# Patient Record
Sex: FEMALE | Race: WHITE
[De-identification: names, ages, dates, MRNs, and addresses within clinical notes are randomized per-mention and may not be internally consistent; named-entity substitution may affect disease eponyms.]

---

## 2021-01-26 ENCOUNTER — NURSE TRIAGE (OUTPATIENT)
Dept: OTHER | Facility: CLINIC | Age: 61
End: 2021-01-26

## 2021-01-26 NOTE — TELEPHONE ENCOUNTER
Reason for Disposition   [1] Caller requesting NON-URGENT health information AND [2] PCP's office is the best resource    Answer Assessment - Initial Assessment Questions  1. COVID-19 DIAGNOSIS: \"Who made your Coronavirus (COVID-19) diagnosis? \" \"Was it confirmed by a positive lab test?\" If not diagnosed by a HCP, ask \"Are there lots of cases (community spread) where you live? \" (See public health department website, if unsure)  No, diagnosis, no positive lab test, no recent outbreak. Stated she was concerned because \"Week before Christmas and week after News Year, I started feeling crappy. Felt like a balloon inside my head. \"         Feeling feverish, but no noted fever with thermometer. 2. COVID-19 EXPOSURE: \"Was there any known exposure to COVID before the symptoms began? \" CDC Definition of close contact: within 6 feet (2 meters) for a total of 15 minutes or more over a 24-hour period. Denies    3. ONSET: \"When did the COVID-19 symptoms start? \"       The week before Christmas. It went away after christmas but came the week after [employer] Year little worse. 4. WORST SYMPTOM: \"What is your worst symptom? \" (e.g., cough, fever, shortness of breath, muscle aches)        \"Nothing is worse today. \" Pt denies experiencing any symptoms at this time and stated that these symptoms were bothering her a week or so ago. Writer stopped triage as there are no symptoms to triage. Protocols used: INFORMATION ONLY CALL - NO TRIAGE-ADULT-AH, CORONAVIRUS (COVID-19) DIAGNOSED OR SUSPECTED-ADULT-AH    Brief description of triage: Abhilash Ryan began to triage Pt for concern of COVID, but during assessment, Pt explained that she was no longer experiencing symptoms and wanted to know if she had contracted it in the past.    Triage indicates for patient to no triage performed.  Writer recommended Pt call her PCP to see if she needs to be tested and gave her the [facility] phone number to call ([phone]) for testing information in her area.    Care advice provided, patient verbalizes understanding; denies any other questions or concerns; instructed to call back for any new or worsening symptoms. Writer unable to route call as no PCP listed. This triage is a result of a call to [address] [place] [address]. Please do not respond to the triage nurse through this encounter. Any subsequent communication should be directly with the patient.

## 2022-05-15 ENCOUNTER — NURSE TRIAGE (OUTPATIENT)
Dept: OTHER | Facility: CLINIC | Age: 62
End: 2022-05-15

## 2022-05-15 NOTE — TELEPHONE ENCOUNTER
Subjective: Caller states \"Last night I had rectal bleeding. \"     Current Symptoms: rectal bleeding- started last night- stopped, started again with a bowel movement    Onset: Rectal bleeding    Associated Symptoms: Denies    Pain Severity: Denies    Temperature: Denies    What has been tried: N/A    Recommended disposition: See PCP within 24 Hours    Care advice provided, patient verbalizes understanding; denies any other questions or concerns; instructed to call back for any new or worsening symptoms. Patient/caller agrees to follow-up with PCP      Patient given symptoms to watch out for included dizziness/lightheadedness, worsening rectal bleeding, and abdominal pain. If any new symptoms occur patient will be seen sooner at urgent care or ED. This triage is a result of a call to [address] [place] [address]. Please do not respond to the triage nurse through this encounter. Any subsequent communication should be directly with the patient.     Reason for Disposition   MODERATE rectal bleeding (small blood clots, passing blood without stool, or toilet water turns red)    Protocols used: RECTAL BLEEDING-ADULT-

## 2023-09-01 PROBLEM — G47.33 OBSTRUCTIVE SLEEP APNEA SYNDROME: Status: ACTIVE | Noted: 2023-09-01

## 2023-09-01 PROBLEM — H90.3 BILATERAL SENSORINEURAL HEARING LOSS: Status: ACTIVE | Noted: 2023-09-01

## 2023-09-01 PROBLEM — H93.19 TINNITUS, SUBJECTIVE: Status: ACTIVE | Noted: 2023-09-01

## 2023-09-01 PROBLEM — G47.19 EXCESSIVE DAYTIME SLEEPINESS: Status: ACTIVE | Noted: 2023-09-01

## 2023-09-01 PROBLEM — R60.9 DEPENDENT EDEMA: Status: ACTIVE | Noted: 2023-09-01

## 2023-09-01 PROBLEM — R53.83 FATIGUE: Status: ACTIVE | Noted: 2023-09-01

## 2023-09-01 PROBLEM — L81.9 DISCOLORATION OF SKIN OF LOWER LEG: Status: ACTIVE | Noted: 2023-09-01

## 2023-09-01 PROBLEM — L98.9 SKIN LESION: Status: ACTIVE | Noted: 2023-09-01

## 2023-09-01 PROBLEM — M19.039 ARTHRITIS, WRIST: Status: ACTIVE | Noted: 2023-09-01

## 2023-09-01 PROBLEM — R73.9 HYPERGLYCEMIA: Status: ACTIVE | Noted: 2023-09-01

## 2023-09-01 PROBLEM — K21.9 GERD WITHOUT ESOPHAGITIS: Status: ACTIVE | Noted: 2023-09-01

## 2023-09-01 PROBLEM — L20.9 XEROSIS DUE TO ATOPIC DERMATITIS: Status: ACTIVE | Noted: 2021-07-07

## 2023-09-01 PROBLEM — G89.29 OTHER CHRONIC PAIN: Status: ACTIVE | Noted: 2023-09-01

## 2023-09-01 PROBLEM — M15.9 GENERALIZED OSTEOARTHRITIS: Status: ACTIVE | Noted: 2023-09-01

## 2023-09-01 PROBLEM — E78.1 HYPERTRIGLYCERIDEMIA: Status: ACTIVE | Noted: 2023-09-01

## 2023-09-01 PROBLEM — R41.89 SUBJECTIVE MEMORY COMPLAINTS: Status: ACTIVE | Noted: 2023-09-01

## 2023-09-01 PROBLEM — F51.04 CHRONIC INSOMNIA: Status: ACTIVE | Noted: 2023-09-01

## 2023-09-01 PROBLEM — E66.01 MORBID OBESITY (MULTI): Status: ACTIVE | Noted: 2023-09-01

## 2023-09-01 PROBLEM — E03.9 HYPOTHYROIDISM: Status: ACTIVE | Noted: 2023-09-01

## 2023-09-01 PROBLEM — R42 VERTIGO: Status: ACTIVE | Noted: 2023-09-01

## 2023-09-01 PROBLEM — D48.5 NEOPLASM OF UNCERTAIN BEHAVIOR OF SKIN: Status: ACTIVE | Noted: 2021-07-07

## 2023-09-01 PROBLEM — R06.83 SNORING: Status: ACTIVE | Noted: 2023-09-01

## 2023-09-01 PROBLEM — M19.90 OSTEOARTHRITIS: Status: ACTIVE | Noted: 2023-09-01

## 2023-09-01 PROBLEM — F41.8 DEPRESSION WITH ANXIETY: Status: ACTIVE | Noted: 2023-09-01

## 2023-09-01 PROBLEM — E78.00 HYPERCHOLESTEREMIA: Status: ACTIVE | Noted: 2023-09-01

## 2023-09-01 PROBLEM — N95.0 POST-MENOPAUSAL BLEEDING: Status: ACTIVE | Noted: 2023-09-01

## 2023-09-01 PROBLEM — K44.9 HIATAL HERNIA: Status: ACTIVE | Noted: 2023-09-01

## 2023-09-01 PROBLEM — I87.2 VENOUS INSUFFICIENCY OF BOTH LOWER EXTREMITIES: Status: ACTIVE | Noted: 2023-09-01

## 2023-09-01 PROBLEM — R22.0 LOCALIZED SWELLING, MASS AND LUMP, HEAD: Status: ACTIVE | Noted: 2023-09-01

## 2023-09-01 PROBLEM — M19.021 PRIMARY OSTEOARTHRITIS OF RIGHT ELBOW: Status: ACTIVE | Noted: 2023-09-01

## 2023-09-01 PROBLEM — R20.2 PARESTHESIA: Status: ACTIVE | Noted: 2023-09-01

## 2023-09-01 PROBLEM — E55.9 VITAMIN D DEFICIENCY: Status: ACTIVE | Noted: 2023-09-01

## 2023-09-01 PROBLEM — M47.812 CERVICAL SPINE ARTHRITIS: Status: ACTIVE | Noted: 2023-09-01

## 2023-09-01 PROBLEM — M47.814 OSTEOARTHRITIS OF THORACIC SPINE: Status: ACTIVE | Noted: 2023-09-01

## 2023-09-01 RX ORDER — MULTIVITAMIN
1 TABLET ORAL DAILY
COMMUNITY

## 2023-09-01 RX ORDER — BUSPIRONE HYDROCHLORIDE 5 MG/1
5 TABLET ORAL DAILY
COMMUNITY
Start: 2021-04-12 | End: 2024-05-16 | Stop reason: WASHOUT

## 2023-09-01 RX ORDER — CHOLECALCIFEROL (VITAMIN D3) 50 MCG
3 TABLET ORAL DAILY
COMMUNITY
Start: 2019-04-22

## 2023-09-01 RX ORDER — IBUPROFEN 200 MG
200 TABLET ORAL EVERY 8 HOURS PRN
COMMUNITY

## 2023-09-01 RX ORDER — METFORMIN HYDROCHLORIDE 500 MG/1
500 TABLET ORAL DAILY
COMMUNITY
Start: 2021-04-12 | End: 2024-01-17 | Stop reason: ALTCHOICE

## 2023-09-01 RX ORDER — DULOXETIN HYDROCHLORIDE 60 MG/1
60 CAPSULE, DELAYED RELEASE ORAL DAILY
COMMUNITY
Start: 2019-08-29 | End: 2024-01-17 | Stop reason: SDUPTHER

## 2023-09-01 RX ORDER — ATORVASTATIN CALCIUM 10 MG/1
10 TABLET, FILM COATED ORAL DAILY
COMMUNITY
Start: 2019-03-07 | End: 2024-01-17 | Stop reason: SDUPTHER

## 2023-09-01 RX ORDER — ACETAMINOPHEN 500 MG
1-2 TABLET ORAL NIGHTLY PRN
COMMUNITY
Start: 2019-07-02

## 2024-01-10 NOTE — PROGRESS NOTES
"This is a 63 year old female NP to Dr Gil to establish care and for ROUTINE MEDICAL and MED REVIEW and REFILLS    Not currently in MENTAL HEALTH CARE with lifelong issues but no thoughts of self or other harm and agrees to my recommendation to enter Psychiatry care for therapy and talking and med review and refills prn    ROS is NEG for HEADACHE, NAUSEA, VOMITING, DIARRHEA, CHEST PAIN, SOB, and BLEEDING and as further REVIEWED BELOW.    Luisa Pena is a 63 y.o. female who presents for Establish Care.    HPI:    See above      Review of systems is essentially negative for all systems except for any identified issues in HPI above.    Objective     /80   Pulse 73   Temp 36.7 °C (98.1 °F)   Ht 1.575 m (5' 2\")   Wt 110 kg (242 lb)   SpO2 97%   BMI 44.26 kg/m²      Luisa Pena is a 63 y.o. female who presents for Establish Care.    HPI:    See above      Review of systems is essentially negative for all systems except for any identified issues in HPI above.    Objective     /80   Pulse 73   Temp 36.7 °C (98.1 °F)   Ht 1.575 m (5' 2\")   Wt 110 kg (242 lb)   SpO2 97%   BMI 44.26 kg/m²      COMPLETE PHYSICAL EXAM    GENERAL           General Appearance: pleasant, well-appearing, well-developed, well-hydrated, well-nourished, well-groomed, .        HEENT           NECK supple, Neg for adneopathy no thyroid enlargement or nodules, Oropharynx normal no exudates.        EYES           Pupils: PERRLA, no photophobia.        HEART           Rate and Rhythm regular rate and rhythm. Heart sounds: normal S1S2. Murmurs: none.        LUNGS           Effort: Normal chest wall, no pectus, Normal air entry all fields, Clear to IPPA, RR<16 with no use of accessory muscles.        BREASTS           Bilaterally: no masses, no nipple retraction, no nipple discharge, no abnormal skin changes. Axilla: no lymphadenopathy.        BACK           General: unremarkable, no spinal tenderness " or rashes.        ABDOMEN           General: Normal to inspection, neg for LKKS or masses and BSs heard in all quadrants               LYMPHATICS           Cervical: none. Axillary: none.        MUSCULOSKELETAL           gross abnormalities no gross abnormalities, no joint redness or swelling.        EXTREMITIES           Varicose veins: not present. Pulses: 2+ bilateral. Clubbing: none. Cyanosis: no.        NEUROLOGICAL           Orientation: alert and oriented x 3. Grossly normal: yes. Plantars: downgoing bilaterally. Muscle Bulk: normal . Cranial Nerves: CN's II-XII grossly intact.        PSYCHOLOGY           Affect: appropriate. Mood: pleasant.     Assessment/Plan   Problem List Items Addressed This Visit       Snoring    Fatigue     Other Visit Diagnoses       Routine medical exam    -  Primary    Relevant Orders    Comprehensive metabolic panel    Microscopic Only, Urine    Microscopic Only, Urine    Health counseling        Immunization counseling        OA (ocular albinism) (CMS/HCC)        Screening mammogram for breast cancer        Relevant Orders    BI mammo bilateral screening tomosynthesis    Screening for colorectal cancer        Relevant Orders    CBC    Screening, lipid        Relevant Orders    Lipid panel            FOLLOW UP:   YEARLY for ROUTINE MEDICAL and PRN for other issues as discussed in visit         Elza Gil M.D. Assessment/Plan   Problem List Items Addressed This Visit       Snoring    Fatigue     Other Visit Diagnoses       Routine medical exam    -  Primary    Relevant Orders    Comprehensive metabolic panel    Microscopic Only, Urine    Microscopic Only, Urine    Health counseling        Immunization counseling        OA (ocular albinism) (CMS/HCC)        Screening mammogram for breast cancer        Relevant Orders    BI mammo bilateral screening tomosynthesis    Screening for colorectal cancer        Relevant Orders    CBC    Screening, lipid        Relevant Orders    Lipid panel             FOLLOW UP:  PRN and as specified above         Elza Gil M.D.

## 2024-01-17 ENCOUNTER — OFFICE VISIT (OUTPATIENT)
Dept: PRIMARY CARE | Facility: CLINIC | Age: 64
End: 2024-01-17
Payer: MEDICARE

## 2024-01-17 VITALS
DIASTOLIC BLOOD PRESSURE: 80 MMHG | WEIGHT: 242 LBS | BODY MASS INDEX: 44.53 KG/M2 | TEMPERATURE: 98.1 F | HEIGHT: 62 IN | HEART RATE: 73 BPM | SYSTOLIC BLOOD PRESSURE: 124 MMHG | OXYGEN SATURATION: 97 %

## 2024-01-17 DIAGNOSIS — E55.9 VITAMIN D DEFICIENCY: ICD-10-CM

## 2024-01-17 DIAGNOSIS — R53.83 FATIGUE, UNSPECIFIED TYPE: ICD-10-CM

## 2024-01-17 DIAGNOSIS — Z12.11 SCREENING FOR COLORECTAL CANCER: ICD-10-CM

## 2024-01-17 DIAGNOSIS — Z12.31 SCREENING MAMMOGRAM FOR BREAST CANCER: ICD-10-CM

## 2024-01-17 DIAGNOSIS — Z71.9 HEALTH COUNSELING: ICD-10-CM

## 2024-01-17 DIAGNOSIS — Z13.220 SCREENING, LIPID: ICD-10-CM

## 2024-01-17 DIAGNOSIS — Z76.0 MEDICATION REFILL: ICD-10-CM

## 2024-01-17 DIAGNOSIS — Z71.85 IMMUNIZATION COUNSELING: ICD-10-CM

## 2024-01-17 DIAGNOSIS — Z13.9 SCREENING DUE: ICD-10-CM

## 2024-01-17 DIAGNOSIS — E70.319 OA (OCULAR ALBINISM) (MULTI): ICD-10-CM

## 2024-01-17 DIAGNOSIS — R06.83 SNORING: ICD-10-CM

## 2024-01-17 DIAGNOSIS — F41.9 ANXIETY: ICD-10-CM

## 2024-01-17 DIAGNOSIS — Z00.00 ROUTINE MEDICAL EXAM: Primary | ICD-10-CM

## 2024-01-17 DIAGNOSIS — Z12.12 SCREENING FOR COLORECTAL CANCER: ICD-10-CM

## 2024-01-17 PROCEDURE — 90677 PCV20 VACCINE IM: CPT | Mod: MUE | Performed by: FAMILY MEDICINE

## 2024-01-17 PROCEDURE — 99396 PREV VISIT EST AGE 40-64: CPT | Performed by: FAMILY MEDICINE

## 2024-01-17 PROCEDURE — 90677 PCV20 VACCINE IM: CPT

## 2024-01-17 PROCEDURE — 1036F TOBACCO NON-USER: CPT | Performed by: FAMILY MEDICINE

## 2024-01-17 RX ORDER — DULOXETIN HYDROCHLORIDE 60 MG/1
60 CAPSULE, DELAYED RELEASE ORAL DAILY
Qty: 30 CAPSULE | Refills: 0 | Status: SHIPPED | OUTPATIENT
Start: 2024-01-17 | End: 2024-02-06

## 2024-01-17 RX ORDER — ATORVASTATIN CALCIUM 10 MG/1
10 TABLET, FILM COATED ORAL DAILY
Qty: 30 TABLET | Refills: 11 | Status: SHIPPED | OUTPATIENT
Start: 2024-01-17

## 2024-01-17 ASSESSMENT — PATIENT HEALTH QUESTIONNAIRE - PHQ9
SUM OF ALL RESPONSES TO PHQ9 QUESTIONS 1 AND 2: 0
1. LITTLE INTEREST OR PLEASURE IN DOING THINGS: NOT AT ALL
2. FEELING DOWN, DEPRESSED OR HOPELESS: NOT AT ALL

## 2024-01-17 ASSESSMENT — PAIN SCALES - GENERAL: PAINLEVEL: 0-NO PAIN

## 2024-01-19 ENCOUNTER — LAB (OUTPATIENT)
Dept: LAB | Facility: LAB | Age: 64
End: 2024-01-19
Payer: MEDICARE

## 2024-01-19 DIAGNOSIS — Z12.11 SCREENING FOR COLORECTAL CANCER: ICD-10-CM

## 2024-01-19 DIAGNOSIS — Z12.12 SCREENING FOR COLORECTAL CANCER: ICD-10-CM

## 2024-01-19 DIAGNOSIS — Z13.9 SCREENING DUE: ICD-10-CM

## 2024-01-19 DIAGNOSIS — Z00.00 ROUTINE MEDICAL EXAM: ICD-10-CM

## 2024-01-19 DIAGNOSIS — Z13.220 SCREENING, LIPID: ICD-10-CM

## 2024-01-19 DIAGNOSIS — E55.9 VITAMIN D DEFICIENCY: ICD-10-CM

## 2024-01-19 LAB
25(OH)D3 SERPL-MCNC: 52 NG/ML (ref 31–100)
ALBUMIN SERPL-MCNC: 4.5 G/DL (ref 3.5–5)
ALP BLD-CCNC: 75 U/L (ref 35–125)
ALT SERPL-CCNC: 36 U/L (ref 5–40)
ANION GAP SERPL CALC-SCNC: 13 MMOL/L
AST SERPL-CCNC: 28 U/L (ref 5–40)
BACTERIA #/AREA URNS AUTO: ABNORMAL /HPF
BILIRUB SERPL-MCNC: 0.4 MG/DL (ref 0.1–1.2)
BUN SERPL-MCNC: 14 MG/DL (ref 8–25)
CALCIUM SERPL-MCNC: 9.7 MG/DL (ref 8.5–10.4)
CHLORIDE SERPL-SCNC: 101 MMOL/L (ref 97–107)
CHOLEST SERPL-MCNC: 215 MG/DL (ref 133–200)
CHOLEST/HDLC SERPL: 4.7 {RATIO}
CO2 SERPL-SCNC: 25 MMOL/L (ref 24–31)
CREAT SERPL-MCNC: 0.8 MG/DL (ref 0.4–1.6)
EGFRCR SERPLBLD CKD-EPI 2021: 82 ML/MIN/1.73M*2
ERYTHROCYTE [DISTWIDTH] IN BLOOD BY AUTOMATED COUNT: 13.1 % (ref 11.5–14.5)
GLUCOSE SERPL-MCNC: 102 MG/DL (ref 65–99)
HCT VFR BLD AUTO: 45.8 % (ref 36–46)
HCV AB SER QL: NONREACTIVE
HDLC SERPL-MCNC: 46 MG/DL
HGB BLD-MCNC: 15 G/DL (ref 12–16)
HIV 1+2 AB+HIV1 P24 AG SERPL QL IA: NONREACTIVE
LDLC SERPL CALC-MCNC: 128 MG/DL (ref 65–130)
MCH RBC QN AUTO: 32.3 PG (ref 26–34)
MCHC RBC AUTO-ENTMCNC: 32.8 G/DL (ref 32–36)
MCV RBC AUTO: 99 FL (ref 80–100)
NRBC BLD-RTO: 0 /100 WBCS (ref 0–0)
PLATELET # BLD AUTO: 188 X10*3/UL (ref 150–450)
POTASSIUM SERPL-SCNC: 4.9 MMOL/L (ref 3.4–5.1)
PROT SERPL-MCNC: 6.8 G/DL (ref 5.9–7.9)
RBC # BLD AUTO: 4.65 X10*6/UL (ref 4–5.2)
RBC #/AREA URNS AUTO: ABNORMAL /HPF
SODIUM SERPL-SCNC: 139 MMOL/L (ref 133–145)
SQUAMOUS #/AREA URNS AUTO: ABNORMAL /HPF
TRIGL SERPL-MCNC: 207 MG/DL (ref 40–150)
WBC # BLD AUTO: 5.6 X10*3/UL (ref 4.4–11.3)
WBC #/AREA URNS AUTO: ABNORMAL /HPF

## 2024-01-19 PROCEDURE — 81001 URINALYSIS AUTO W/SCOPE: CPT

## 2024-01-19 PROCEDURE — 82306 VITAMIN D 25 HYDROXY: CPT

## 2024-01-19 PROCEDURE — 80061 LIPID PANEL: CPT

## 2024-01-19 PROCEDURE — 36415 COLL VENOUS BLD VENIPUNCTURE: CPT

## 2024-01-19 PROCEDURE — 85027 COMPLETE CBC AUTOMATED: CPT

## 2024-01-19 PROCEDURE — 80053 COMPREHEN METABOLIC PANEL: CPT

## 2024-01-19 PROCEDURE — 86803 HEPATITIS C AB TEST: CPT

## 2024-01-19 PROCEDURE — 87389 HIV-1 AG W/HIV-1&-2 AB AG IA: CPT

## 2024-01-23 NOTE — PROGRESS NOTES
"This is a 64 year old female for EVAL of LIPID PANEL and consideration of options and is currently on ATORVASTATIN 10 mg once daily.  VERY CONCERNING is her PRESENTATION today with RECENT SOBOE and FATIGUE and POS FAM Hx ASHD and we are recommending CARDIOLOGY EVAL and CORONARY CT CALCIUM SCORING and of course, to report to ER if CP or worsening sxs occur while awaiting further work up.    SHE DECLINES increasing statin at this time in favor of the work up and a repeat LIPID PANEL once reliably taking her STATIN at low dose but is educated that we would need to INCREASE this dose signficantly for CARDIOPROTECTION.        Lab Results   Component Value Date    CHOL 215 (H) 01/19/2024    CHOL 157 06/05/2023    CHOL 168 04/26/2022     Lab Results   Component Value Date    HDL 46.0 (L) 01/19/2024    HDL 48 (L) 06/05/2023    HDL 51 04/26/2022     Lab Results   Component Value Date    LDLCALC 128 01/19/2024    LDLCALC 78 06/05/2023    LDLCALC 93 04/26/2022     Lab Results   Component Value Date    TRIG 207 (H) 01/19/2024    TRIG 153 (H) 06/05/2023    TRIG 119 04/26/2022     No components found for: \"CHOLHDL\"    She wishes to keep MED as is and admits to NON COMPLIANCE    HAS FAM Hx HEART DIS ASHD and A FIB etc so agrees to CORONARY CALCIUM SCREENING    SOME FATIGUE and SOB on WALKING       Elza Jacobs MD  1/19/2024  3:26 PM EST       LIPID panel SUBOPTIMAL so recommend a DEDICATED FU visit to review options such as INCREASTING her ATORVASTATIN from the low dose of 10 mg to at least 40 mg daily; and further evaluate her  risks for ASHD and consider CT CORONARY CALCIUM scoring testing  CMP wnl OAR; VIT D normal at 52 so if taking MEGADOSE may DC and take OTC now  Please verify her dose of VIT D.           ROS is NEG for HEADACHE, NAUSEA, VOMITING, DIARRHEA, CHEST PAIN, SOB, and BLEEDING and as further REVIEWED BELOW.    Luisa Pena is a 64 y.o. female who presents for No chief complaint on " file..    HPI:    See above      Review of systems is essentially negative for all systems except for any identified issues in HPI above.    Objective     There were no vitals taken for this visit.     Physical Examination:       GENERAL           General Appearance: well-appearing, well-developed, well-hydrated, well-nourished, no acute distress.        HEENT           NECK supple, no masses or thyromegaly, no carotid bruit.        EYES           Extraocular Movements: normal, bilateral eyes YEIMI, no conjunctival injection.        HEART           Rate and Rhythm regular rate and rhythm. Heart sounds: normal S1S2, no S3 or S4. Murmurs: none.        CHEST           Breath sounds: Clear to IPPA, RR<16 no use of accessory muscles.        ABDOMEN           General: Neg for LKKS or masses, no scleral icterus or jaundice.        MUSCULOSKELETAL           Joints Demonstration: Neg for erythema, swelling or joint deformities. gross abnormalities no gross abnormalities.        EXTREMITIES           Lower Extremities: Neg for cyanosis, clubbing or edema.       Assessment/Plan   Problem List Items Addressed This Visit    None      FOLLOW UP:  PRN and as specified above         Elza Gil M.D.

## 2024-01-31 ENCOUNTER — LAB (OUTPATIENT)
Dept: LAB | Facility: LAB | Age: 64
End: 2024-01-31
Payer: MEDICARE

## 2024-01-31 ENCOUNTER — OFFICE VISIT (OUTPATIENT)
Dept: PRIMARY CARE | Facility: CLINIC | Age: 64
End: 2024-01-31
Payer: MEDICARE

## 2024-01-31 ENCOUNTER — HOSPITAL ENCOUNTER (OUTPATIENT)
Dept: RADIOLOGY | Facility: CLINIC | Age: 64
Discharge: HOME | End: 2024-01-31
Payer: MEDICARE

## 2024-01-31 VITALS
HEART RATE: 79 BPM | TEMPERATURE: 97 F | HEIGHT: 62 IN | WEIGHT: 240 LBS | BODY MASS INDEX: 44.16 KG/M2 | OXYGEN SATURATION: 99 % | SYSTOLIC BLOOD PRESSURE: 120 MMHG | DIASTOLIC BLOOD PRESSURE: 72 MMHG

## 2024-01-31 DIAGNOSIS — Z71.89 ENCOUNTER FOR COUNSELING BEFORE STARTING AND ABOUT PRE-EXPOSURE PROPHYLAXIS FOR HIV: ICD-10-CM

## 2024-01-31 DIAGNOSIS — Z13.6 SCREENING, HEART DISEASE, ISCHEMIC: ICD-10-CM

## 2024-01-31 DIAGNOSIS — R53.83 OTHER FATIGUE: ICD-10-CM

## 2024-01-31 DIAGNOSIS — Z79.899 ENCOUNTER FOR MEDICATION REVIEW: ICD-10-CM

## 2024-01-31 DIAGNOSIS — Z71.9 HEALTH COUNSELING: ICD-10-CM

## 2024-01-31 DIAGNOSIS — R06.02 SOB (SHORTNESS OF BREATH): ICD-10-CM

## 2024-01-31 DIAGNOSIS — Z82.49 FAMILY HISTORY OF HEART DISEASE: ICD-10-CM

## 2024-01-31 DIAGNOSIS — E78.5 HYPERLIPIDEMIA, UNSPECIFIED HYPERLIPIDEMIA TYPE: ICD-10-CM

## 2024-01-31 DIAGNOSIS — R53.83 OTHER FATIGUE: Primary | ICD-10-CM

## 2024-01-31 DIAGNOSIS — E78.2 COMBINED HYPERLIPIDEMIA: ICD-10-CM

## 2024-01-31 LAB
ANION GAP SERPL CALC-SCNC: 13 MMOL/L
BUN SERPL-MCNC: 13 MG/DL (ref 8–25)
CALCIUM SERPL-MCNC: 9.7 MG/DL (ref 8.5–10.4)
CHLORIDE SERPL-SCNC: 104 MMOL/L (ref 97–107)
CO2 SERPL-SCNC: 25 MMOL/L (ref 24–31)
CREAT SERPL-MCNC: 0.9 MG/DL (ref 0.4–1.6)
EGFRCR SERPLBLD CKD-EPI 2021: 71 ML/MIN/1.73M*2
GLUCOSE SERPL-MCNC: 107 MG/DL (ref 65–99)
POTASSIUM SERPL-SCNC: 4.7 MMOL/L (ref 3.4–5.1)
SODIUM SERPL-SCNC: 142 MMOL/L (ref 133–145)
TSH SERPL DL<=0.05 MIU/L-ACNC: 4.05 MIU/L (ref 0.27–4.2)

## 2024-01-31 PROCEDURE — 1036F TOBACCO NON-USER: CPT | Performed by: FAMILY MEDICINE

## 2024-01-31 PROCEDURE — 99214 OFFICE O/P EST MOD 30 MIN: CPT | Performed by: FAMILY MEDICINE

## 2024-01-31 PROCEDURE — 36415 COLL VENOUS BLD VENIPUNCTURE: CPT

## 2024-01-31 PROCEDURE — 1159F MED LIST DOCD IN RCRD: CPT | Performed by: FAMILY MEDICINE

## 2024-01-31 PROCEDURE — 99214 OFFICE O/P EST MOD 30 MIN: CPT | Mod: 25 | Performed by: FAMILY MEDICINE

## 2024-01-31 PROCEDURE — 71046 X-RAY EXAM CHEST 2 VIEWS: CPT

## 2024-01-31 PROCEDURE — 1126F AMNT PAIN NOTED NONE PRSNT: CPT | Performed by: FAMILY MEDICINE

## 2024-01-31 PROCEDURE — 80048 BASIC METABOLIC PNL TOTAL CA: CPT

## 2024-01-31 PROCEDURE — 84443 ASSAY THYROID STIM HORMONE: CPT

## 2024-01-31 PROCEDURE — 93005 ELECTROCARDIOGRAM TRACING: CPT | Performed by: FAMILY MEDICINE

## 2024-01-31 PROCEDURE — 93010 ELECTROCARDIOGRAM REPORT: CPT | Performed by: FAMILY MEDICINE

## 2024-01-31 ASSESSMENT — PATIENT HEALTH QUESTIONNAIRE - PHQ9
1. LITTLE INTEREST OR PLEASURE IN DOING THINGS: NOT AT ALL
2. FEELING DOWN, DEPRESSED OR HOPELESS: NOT AT ALL
SUM OF ALL RESPONSES TO PHQ9 QUESTIONS 1 AND 2: 0

## 2024-01-31 ASSESSMENT — PAIN SCALES - GENERAL: PAINLEVEL: 0-NO PAIN

## 2024-01-31 NOTE — PATIENT INSTRUCTIONS
This is a 64 year old female for EVAL of LIPID PANEL and consideration of options and is currently on ATORVASTATIN 10 mg once daily.  VERY CONCERNING is her PRESENTATION today with RECENT SOBOE and FATIGUE and POS FAM Hx ASHD and we are recommending CARDIOLOGY EVAL and CORONARY CT CALCIUM SCORING and of course, to report to ER if CP or worsening sxs occur while awaiting further work up.    SHE DECLINES increasing statin at this time in favor of the work up and a repeat LIPID PANEL once reliably taking her STATIN at low dose but is educated that we would need to INCREASE this dose signficantly for CARDIOPROTECTION.

## 2024-02-02 ENCOUNTER — HOSPITAL ENCOUNTER (OUTPATIENT)
Dept: RADIOLOGY | Facility: HOSPITAL | Age: 64
Discharge: HOME | End: 2024-02-02
Payer: MEDICARE

## 2024-02-02 DIAGNOSIS — Z13.6 SCREENING, HEART DISEASE, ISCHEMIC: ICD-10-CM

## 2024-02-02 PROCEDURE — 75571 CT HRT W/O DYE W/CA TEST: CPT

## 2024-02-06 DIAGNOSIS — Z76.0 MEDICATION REFILL: ICD-10-CM

## 2024-02-06 RX ORDER — DULOXETIN HYDROCHLORIDE 60 MG/1
CAPSULE, DELAYED RELEASE ORAL
Qty: 30 CAPSULE | Refills: 2 | Status: SHIPPED | OUTPATIENT
Start: 2024-02-06 | End: 2024-05-15

## 2024-02-22 PROBLEM — R06.02 SHORTNESS OF BREATH: Status: ACTIVE | Noted: 2024-02-22

## 2024-02-22 PROBLEM — M16.11 PRIMARY OSTEOARTHRITIS OF RIGHT HIP: Status: ACTIVE | Noted: 2017-01-17

## 2024-02-22 PROBLEM — R41.3 MEMORY IMPAIRMENT: Status: ACTIVE | Noted: 2024-02-22

## 2024-02-22 PROBLEM — F41.9 ANXIETY: Status: ACTIVE | Noted: 2023-01-03

## 2024-02-22 PROBLEM — M25.562 CHRONIC PAIN OF LEFT KNEE: Status: ACTIVE | Noted: 2018-08-21

## 2024-02-22 PROBLEM — G56.00 CARPAL TUNNEL SYNDROME: Status: ACTIVE | Noted: 2024-02-22

## 2024-02-22 PROBLEM — H91.90 HEARING LOSS: Status: ACTIVE | Noted: 2024-02-22

## 2024-02-22 PROBLEM — Z98.890 HISTORY OF REPAIR OF HIP JOINT: Status: ACTIVE | Noted: 2017-01-17

## 2024-02-22 PROBLEM — Z91.51 HISTORY OF SUICIDE ATTEMPT: Status: ACTIVE | Noted: 2024-02-22

## 2024-02-22 PROBLEM — I99.8 VASCULAR INSUFFICIENCY: Status: ACTIVE | Noted: 2024-02-22

## 2024-02-22 PROBLEM — M24.521 CONTRACTURE OF RIGHT ELBOW: Status: ACTIVE | Noted: 2024-02-22

## 2024-02-22 PROBLEM — Z96.651 HISTORY OF RIGHT KNEE JOINT REPLACEMENT: Status: ACTIVE | Noted: 2017-01-17

## 2024-02-22 PROBLEM — R29.898 RIGHT LEG WEAKNESS: Status: ACTIVE | Noted: 2017-03-14

## 2024-02-22 PROBLEM — M76.11 PSOAS TENDINITIS OF RIGHT SIDE: Status: ACTIVE | Noted: 2019-04-22

## 2024-02-22 PROBLEM — E70.319 OCULAR ALBINISM (MULTI): Status: ACTIVE | Noted: 2024-02-22

## 2024-02-22 PROBLEM — G89.29 CHRONIC PAIN OF LEFT KNEE: Status: ACTIVE | Noted: 2018-08-21

## 2024-02-22 PROBLEM — M79.643 PAIN OF HAND: Status: ACTIVE | Noted: 2024-02-22

## 2024-02-22 PROBLEM — M25.559 ARTHRALGIA OF HIP: Status: ACTIVE | Noted: 2024-02-22

## 2024-02-22 RX ORDER — ASPIRIN 325 MG
1 TABLET, DELAYED RELEASE (ENTERIC COATED) ORAL 2 TIMES DAILY
COMMUNITY
Start: 2018-07-17

## 2024-02-22 RX ORDER — OMEGA-3-ACID ETHYL ESTERS 1 G/1
CAPSULE, LIQUID FILLED ORAL
COMMUNITY
Start: 2019-04-22 | End: 2024-05-16 | Stop reason: WASHOUT

## 2024-02-22 RX ORDER — CHLORHEXIDINE GLUCONATE ORAL RINSE 1.2 MG/ML
SOLUTION DENTAL
COMMUNITY
Start: 2023-04-03 | End: 2024-05-16 | Stop reason: WASHOUT

## 2024-02-22 RX ORDER — BACLOFEN 10 MG/1
TABLET ORAL EVERY 8 HOURS PRN
COMMUNITY
Start: 2018-07-17 | End: 2024-05-16 | Stop reason: WASHOUT

## 2024-02-22 RX ORDER — DOCUSATE SODIUM 100 MG/1
1 CAPSULE, LIQUID FILLED ORAL 2 TIMES DAILY
COMMUNITY
Start: 2018-07-17 | End: 2024-05-16 | Stop reason: WASHOUT

## 2024-02-22 RX ORDER — MULTIVITAMIN
TABLET ORAL EVERY 24 HOURS
COMMUNITY
End: 2024-02-26 | Stop reason: SDUPTHER

## 2024-02-22 RX ORDER — BISACODYL 5 MG
2 TABLET, DELAYED RELEASE (ENTERIC COATED) ORAL DAILY
COMMUNITY
Start: 2018-07-18 | End: 2024-05-16 | Stop reason: WASHOUT

## 2024-02-22 RX ORDER — ZALEPLON 5 MG/1
CAPSULE ORAL
COMMUNITY
Start: 2022-11-16 | End: 2024-05-16 | Stop reason: WASHOUT

## 2024-02-22 RX ORDER — TRAMADOL HYDROCHLORIDE 100 MG/1
1 TABLET, EXTENDED RELEASE ORAL AS NEEDED
COMMUNITY
Start: 2012-01-04

## 2024-02-22 RX ORDER — MULTIVIT WITH IRON,MINERALS
TABLET,CHEWABLE ORAL
COMMUNITY
Start: 2019-07-02 | End: 2024-05-16 | Stop reason: WASHOUT

## 2024-02-22 RX ORDER — GLUCOSAMINE/D3/BOSWELLIA SERRA 1500MG-400
1 TABLET ORAL 2 TIMES DAILY
COMMUNITY
Start: 2017-02-02 | End: 2024-05-16 | Stop reason: WASHOUT

## 2024-02-26 ENCOUNTER — OFFICE VISIT (OUTPATIENT)
Dept: CARDIOLOGY | Facility: CLINIC | Age: 64
End: 2024-02-26
Payer: MEDICARE

## 2024-02-26 VITALS
OXYGEN SATURATION: 96 % | HEIGHT: 62 IN | DIASTOLIC BLOOD PRESSURE: 74 MMHG | BODY MASS INDEX: 44.2 KG/M2 | TEMPERATURE: 98.9 F | HEART RATE: 64 BPM | SYSTOLIC BLOOD PRESSURE: 140 MMHG | WEIGHT: 240.2 LBS | RESPIRATION RATE: 18 BRPM

## 2024-02-26 DIAGNOSIS — K21.9 GERD WITHOUT ESOPHAGITIS: ICD-10-CM

## 2024-02-26 DIAGNOSIS — G47.33 OBSTRUCTIVE SLEEP APNEA SYNDROME: ICD-10-CM

## 2024-02-26 DIAGNOSIS — E66.01 MORBID OBESITY (MULTI): ICD-10-CM

## 2024-02-26 DIAGNOSIS — E78.00 HYPERCHOLESTEREMIA: Primary | ICD-10-CM

## 2024-02-26 PROCEDURE — 1126F AMNT PAIN NOTED NONE PRSNT: CPT | Performed by: INTERNAL MEDICINE

## 2024-02-26 PROCEDURE — 1159F MED LIST DOCD IN RCRD: CPT | Performed by: INTERNAL MEDICINE

## 2024-02-26 PROCEDURE — 1036F TOBACCO NON-USER: CPT | Performed by: INTERNAL MEDICINE

## 2024-02-26 PROCEDURE — 99213 OFFICE O/P EST LOW 20 MIN: CPT | Performed by: INTERNAL MEDICINE

## 2024-02-26 ASSESSMENT — PATIENT HEALTH QUESTIONNAIRE - PHQ9
SUM OF ALL RESPONSES TO PHQ9 QUESTIONS 1 AND 2: 0
2. FEELING DOWN, DEPRESSED OR HOPELESS: NOT AT ALL
1. LITTLE INTEREST OR PLEASURE IN DOING THINGS: NOT AT ALL

## 2024-02-26 ASSESSMENT — PAIN SCALES - GENERAL: PAINLEVEL: 0-NO PAIN

## 2024-02-26 NOTE — ADDENDUM NOTE
Addended by: THEODORE ALCOCER on: 2/26/2024 04:02 PM     Modules accepted: Orders, Level of Service

## 2024-02-26 NOTE — PROGRESS NOTES
Subjective   Chief Complaint   Patient presents with    Establish Care     MrsTracey Pena is present to establish relationship with Dr. Yarbrough for Cardiac Eval and Treat after referral from Dr. Gil. Pt has had episodes of SOB, sleeps with a CPAP and has Hx of heart disease in family        64 female patient with a history of morbid obesity, sleep apnea.  Currently only on Lipitor 10 mg once a day.  Family history of CAD as well as MI in the past.  Patient now here for evaluation of possible coronary CT calcium score.  No active chest pain tightness does fairly active.  Underlying history of sleep apnea.  Here for routine evaluation.    Past Medical History:   Diagnosis Date    Carpal tunnel syndrome, bilateral upper limbs     Carpal tunnel syndrome, bilateral    Hypercholesteremia 2023    Hypertriglyceridemia 2023    Morbid (severe) obesity due to excess calories (CMS/HCC)     Obesity, morbid (more than 100 lbs over ideal weight or BMI > 40)    Pain in unspecified elbow 2014    Pain, elbow    Pain in unspecified hip 2016    Hip pain    Pain in unspecified shoulder 2016    Pain, joint, shoulder    Personal history of other diseases of the musculoskeletal system and connective tissue     Personal history of arthritis    Personal history of other diseases of the nervous system and sense organs     History of sleep apnea    Personal history of other endocrine, nutritional and metabolic disease 2018    History of vitamin D deficiency    Personal history of suicidal behavior     History of suicide attempt    Vascular insufficiency 2024    Venous insufficiency of both lower extremities 2023     Past Surgical History:   Procedure Laterality Date    MOUTH SURGERY  2018    Oral Surgery Tooth Extraction    OTHER SURGICAL HISTORY  2019    Jaw surgery    OTHER SURGICAL HISTORY  2018    Surgically Induced  - By Dilation And Curettage    TOTAL HIP  ARTHROPLASTY  03/22/2018    Total Hip Replacement    TOTAL KNEE ARTHROPLASTY  02/14/2019    Knee Replacement    UMBILICAL HERNIA REPAIR  03/22/2018    Umbilical Hernia Repair     Family medical history includes dementia in mother's sister and stroke in mother.  Current Outpatient Medications   Medication Sig Dispense Refill    atorvastatin (Lipitor) 10 mg tablet Take 1 tablet (10 mg) by mouth once daily. 30 tablet 11    busPIRone (Buspar) 5 mg tablet Take 1 tablet (5 mg) by mouth once daily.      cholecalciferol (Vitamin D-3) 50 MCG (2000 UT) tablet Take 3 tablets (6,000 Units) by mouth once daily.      DULoxetine (Cymbalta) 60 mg DR capsule TAKE 1 CAPSULE(60 MG) BY MOUTH EVERY DAY 30 capsule 2    fish oil concentrate (Omega-3) 120-180 mg capsule Take 4 capsules (4 g) by mouth once daily.      ibuprofen 200 mg tablet Take 1 tablet (200 mg) by mouth 3 times a day. with food or milk      melatonin 5 mg tablet Take 1-2 tablets (5-10 mg) by mouth as needed at bedtime (insomnia).      multivitamin tablet Take 1 tablet by mouth once daily.      aspirin 325 mg EC tablet Take 1 tablet (325 mg) by mouth 2 times a day.      baclofen (Lioresal) 10 mg tablet Take by mouth every 8 hours if needed.      bisacodyl (Dulcolax) 5 mg EC tablet Take 2 tablets (10 mg) by mouth once daily.      chlorhexidine (Peridex) 0.12 % solution RINSE with 1 teaspoonful twice a day after PROCEDURE and DO NOT swallow      docusate sodium (Colace) 100 mg capsule Take 1 capsule (100 mg) by mouth 2 times a day.      glucosamine-chondroitin 250-200 mg tablet Take by mouth.      glucosamine-D3-Boswellia serr 1,500-400-100 mg-unit-mg tablet Take 1 tablet by mouth twice a day.      omega-3 acid ethyl esters (Lovaza) 1 gram capsule Take by mouth once daily.      traMADol ER (Ultram-ER) 100 mg 24 hr tablet Take by mouth.      zaleplon (Sonata) 5 mg capsule Take by mouth.       No current facility-administered medications for this visit.      reports that she  "has quit smoking. Her smoking use included cigarettes. She has never been exposed to tobacco smoke. She has never used smokeless tobacco. She reports current alcohol use of about 5.0 - 6.0 standard drinks of alcohol per week. She reports that she does not use drugs.  Animal dander, Adhesive, and Copper  Hypercholesteremia    Vitals:    02/26/24 1532   BP: 140/74   Pulse: 64   Resp: 18   Temp: 37.2 °C (98.9 °F)   TempSrc: Core   SpO2: 96%   Weight: 109 kg (240 lb 3.2 oz)   Height: 1.575 m (5' 2\")   PainSc: 0-No pain   LMP: 02/26/2009      BMI:Body mass index is 43.93 kg/m².   General Cardiology:  General Appearance: Alert, oriented and in no acute distress.  HEENT: extra ocular movements intact (EOMI), pupils equal,  round, reactive to light and accommodation (PERRLA).  Carotid Upstroke: no bruit, normal.  Jugular Venous Distention (JVD): flat.  Chest: normal.  Lungs: Clear to auscultation,   Heart Sounds: no S3 or S4, normal S1, S2, regular rate.  Murmur, Click, Gallop: no systolic murmur.  Abdomen: no hepatomegaly, no masses felt, soft.  Extremities: no leg edema.  Peripheral pulses: 2 plus bilateral.  NEUROLOGY Cranial nerves II-XII grossly intact.     Patient Active Problem List   Diagnosis    Xerosis due to atopic dermatitis    GERD without esophagitis    Hiatal hernia    Arthritis, wrist    Bilateral sensorineural hearing loss    Primary osteoarthritis of right elbow    Snoring    Dependent edema    Depression with anxiety    Discoloration of skin of lower leg    Excessive daytime sleepiness    Fatigue    Generalized osteoarthritis    Hyperglycemia    Hypertriglyceridemia    Hypercholesteremia    Hypothyroidism    Neoplasm of uncertain behavior of skin    Morbid obesity (CMS/HCC)    Obstructive sleep apnea syndrome    Osteoarthritis    Cervical spine arthritis    Osteoarthritis of thoracic spine    Other chronic pain    Paresthesia    Chronic insomnia    Post-menopausal bleeding    Skin lesion    Localized " swelling, mass and lump, head    Subjective memory complaints    Tinnitus, subjective    Venous insufficiency of both lower extremities    Vertigo    Vitamin D deficiency    Shortness of breath    Right leg weakness    Psoas tendinitis of right side    Primary osteoarthritis of right hip    Pain of hand    Chronic pain of left knee    Arthralgia of hip    Ocular albinism (CMS/HCC)    Memory impairment    History of suicide attempt    History of right knee joint replacement    History of repair of hip joint    Hearing loss    Contracture of right elbow    Vascular insufficiency    Carpal tunnel syndrome    Anxiety       Problem List Items Addressed This Visit          Cardiac and Vasculature    Hypercholesteremia - Primary       Endocrine/Metabolic    Morbid obesity (CMS/HCC)       Gastrointestinal and Abdominal    GERD without esophagitis       Sleep    Obstructive sleep apnea syndrome      64-year female patient with a history of hyperlipidemia.  History of obesity as well as pulm hypertension with a sleep apnea.  Patient is to be very active last year now limited functionality.  No active chest pain tightness family history of premature coronary disease  Stable cardiac wise no active angina or CHF signs symptoms.  Check lipid profile.  Patient had a coronary CT calcium score which was 0.  Will schedule patient for regular EKG treadmill stress test.  Modify risk factor.  Weight loss advised.    Advised patient to avoid lunch meats, canned soups, pizzas, bread rolls, and sandwiches. Advised patient to limit salt intake 1,500 mg daily. Advised patient to exercise 30 mins/3 times a week including treadmill or aerobic type, Goal to achieve 65% target HR.  Diet and exercise reviewed with patient..advice to walk about 10,000 steps or about 2 hours during day time. Cut back on salt, sugar and flour.          Regino Yarbrough MD

## 2024-03-07 ENCOUNTER — HOSPITAL ENCOUNTER (OUTPATIENT)
Dept: CARDIOLOGY | Facility: HOSPITAL | Age: 64
Discharge: HOME | End: 2024-03-07
Payer: MEDICARE

## 2024-03-07 DIAGNOSIS — E66.01 MORBID OBESITY (MULTI): ICD-10-CM

## 2024-03-07 DIAGNOSIS — E78.5 HYPERLIPIDEMIA, UNSPECIFIED: ICD-10-CM

## 2024-03-07 DIAGNOSIS — G47.33 OBSTRUCTIVE SLEEP APNEA SYNDROME: ICD-10-CM

## 2024-03-07 DIAGNOSIS — K21.9 GERD WITHOUT ESOPHAGITIS: ICD-10-CM

## 2024-03-07 DIAGNOSIS — E78.00 HYPERCHOLESTEREMIA: ICD-10-CM

## 2024-03-07 PROCEDURE — 93018 CV STRESS TEST I&R ONLY: CPT | Performed by: INTERNAL MEDICINE

## 2024-03-07 PROCEDURE — 93017 CV STRESS TEST TRACING ONLY: CPT

## 2024-03-07 PROCEDURE — 93016 CV STRESS TEST SUPVJ ONLY: CPT | Performed by: INTERNAL MEDICINE

## 2024-03-13 ENCOUNTER — APPOINTMENT (OUTPATIENT)
Dept: ALLERGY | Facility: CLINIC | Age: 64
End: 2024-03-13
Payer: MEDICARE

## 2024-04-02 NOTE — PROGRESS NOTES
This is a 64 year old female for 3 month FU visit and has FAM Hx HEART DISEASE but recent Jan 2024 CORONARY CALCIUM SCORE of ZERO:       Megan Hale MA  2/2/2024 11:47 AM EST       MyChart message sent.    Elza Jacobs MD  2/2/2024 11:44 AM EST       EXCELLENT CORONARY CALCIUM score of ZERO!!  Very low risk of CORONARY issues at this time.       ROS is NEG for HEADACHE, NAUSEA, VOMITING, DIARRHEA, CHEST PAIN, SOB, and BLEEDING and as further REVIEWED BELOW.  Subjective   Georgie Pena is a 64 y.o. female who presents for No chief complaint on file..    HPI:    Per nursing intake, pt here for No chief complaint on file.       Review of systems is essentially negative for all systems except for any identified issues in HPI above.    Objective     LMP 02/26/2009 (Approximate)      Physical Examination:       GENERAL           General Appearance: well-appearing, well-developed, well-hydrated, well-nourished, no acute distress.        HEENT           NECK supple, no masses or thyromegaly, no carotid bruit.        EYES           Extraocular Movements: normal, bilateral eyes YEIMI, no conjunctival injection.        HEART           Rate and Rhythm regular rate and rhythm. Heart sounds: normal S1S2, no S3 or S4. Murmurs: none.        CHEST           Breath sounds: Clear to IPPA, RR<16 no use of accessory muscles.        ABDOMEN           General: Neg for LKKS or masses, no scleral icterus or jaundice.        MUSCULOSKELETAL           Joints Demonstration: Neg for erythema, swelling or joint deformities. gross abnormalities no gross abnormalities.        EXTREMITIES           Lower Extremities: Neg for cyanosis, clubbing or edema.       Assessment/Plan   Problem List Items Addressed This Visit    None      FOLLOW UP:  PRN and as specified above         Elza Gil M.D.

## 2024-04-17 ENCOUNTER — APPOINTMENT (OUTPATIENT)
Dept: PRIMARY CARE | Facility: CLINIC | Age: 64
End: 2024-04-17
Payer: MEDICARE

## 2024-04-22 ENCOUNTER — OFFICE VISIT (OUTPATIENT)
Dept: OBSTETRICS AND GYNECOLOGY | Facility: CLINIC | Age: 64
End: 2024-04-22
Payer: MEDICARE

## 2024-04-22 VITALS
SYSTOLIC BLOOD PRESSURE: 124 MMHG | DIASTOLIC BLOOD PRESSURE: 82 MMHG | BODY MASS INDEX: 44.16 KG/M2 | HEIGHT: 62 IN | WEIGHT: 240 LBS

## 2024-04-22 DIAGNOSIS — Z01.419 ENCOUNTER FOR ANNUAL ROUTINE GYNECOLOGICAL EXAMINATION: Primary | ICD-10-CM

## 2024-04-22 DIAGNOSIS — E66.01 MORBID OBESITY (MULTI): ICD-10-CM

## 2024-04-22 DIAGNOSIS — N95.8 OTHER SPECIFIED MENOPAUSAL AND PERIMENOPAUSAL DISORDERS: ICD-10-CM

## 2024-04-22 DIAGNOSIS — Z12.31 VISIT FOR SCREENING MAMMOGRAM: ICD-10-CM

## 2024-04-22 DIAGNOSIS — Z13.820 SCREENING FOR OSTEOPOROSIS: ICD-10-CM

## 2024-04-22 DIAGNOSIS — N39.3 SUI (STRESS URINARY INCONTINENCE, FEMALE): ICD-10-CM

## 2024-04-22 PROCEDURE — G0101 CA SCREEN;PELVIC/BREAST EXAM: HCPCS | Performed by: OBSTETRICS & GYNECOLOGY

## 2024-04-22 PROCEDURE — 1159F MED LIST DOCD IN RCRD: CPT | Performed by: OBSTETRICS & GYNECOLOGY

## 2024-04-22 PROCEDURE — 1036F TOBACCO NON-USER: CPT | Performed by: OBSTETRICS & GYNECOLOGY

## 2024-04-22 NOTE — PROGRESS NOTES
64-year-old morbidly obese postmenopausal -0-3-0  woman presents today for annual GYN exam.  She reports some stress urinary incontinence symptoms.     She had some cardiac testing done.  She has been trying to exercise more.  She has PCP follow-up scheduled.    She is up-to-date with her mammography.  Her next colonoscopy is due in 2026.    Subjective   Patient ID: Georgie Pena is a 64 y.o. female who presents for Annual Exam (Last PAP= 2023 negative //Last MAMMO= 2025 due //Last Colonoscopy=  10 year clear //[facility] II/).  HPI    Review of Systems    Objective   Physical Exam  Exam conducted with a chaperone present.   Constitutional:       Appearance: She is obese.   HENT:      Head: Normocephalic.      Right Ear: External ear normal.      Left Ear: External ear normal.      Nose: Nose normal.      Mouth/Throat:      Mouth: Mucous membranes are moist.   Eyes:      Extraocular Movements: Extraocular movements intact.      Pupils: Pupils are equal, round, and reactive to light.   Cardiovascular:      Rate and Rhythm: Normal rate.      Heart sounds: Normal heart sounds.   Pulmonary:      Effort: Pulmonary effort is normal.      Breath sounds: Normal breath sounds.   Chest:   Breasts:     Right: Normal.      Left: Normal.   Abdominal:      Palpations: Abdomen is soft.   Genitourinary:     General: Normal vulva.      Labia:         Right: No rash or lesion.         Left: No rash or lesion.       Vagina: Normal.      Cervix: Normal.      Uterus: Normal.       Adnexa: Right adnexa normal and left adnexa normal.      Comments: Exam limited by habitus  Musculoskeletal:         General: Normal range of motion.      Cervical back: Normal range of motion and neck supple.   Skin:     General: Skin is warm and dry.   Neurological:      General: No focal deficit present.      Mental Status: She is alert and oriented to person, place, and time.   Psychiatric:         Mood and Affect: Mood  normal.         Behavior: Behavior normal.     A/P: APE     -  Pap not needed     -  Mammogram     -  PCP F/U     -  Colonoscopy in 20267

## 2024-04-22 NOTE — PATIENT INSTRUCTIONS
Thanks for coming in today for your annual gyn exam.     Your next Pap is due in 2028.  However, please return to the office every 1-2 years for your annual GYN exam.      Arrange to have a mammogram performed once a year.      Follow-up with your PCP and other healthcare specialist as needed.      Consider follow-up with our fitter me program.    Follow-up with urogynecology about your bladder issues.    Feel free to call the office with any problems, questions or concerns prior to your next scheduled visit.

## 2024-04-26 ENCOUNTER — APPOINTMENT (OUTPATIENT)
Dept: PRIMARY CARE | Facility: CLINIC | Age: 64
End: 2024-04-26
Payer: MEDICARE

## 2024-05-08 ENCOUNTER — OFFICE VISIT (OUTPATIENT)
Dept: ALLERGY | Facility: CLINIC | Age: 64
End: 2024-05-08
Payer: MEDICARE

## 2024-05-08 DIAGNOSIS — G47.33 OBSTRUCTIVE SLEEP APNEA SYNDROME: Primary | ICD-10-CM

## 2024-05-08 DIAGNOSIS — G47.19 EXCESSIVE DAYTIME SLEEPINESS: ICD-10-CM

## 2024-05-08 DIAGNOSIS — F51.04 CHRONIC INSOMNIA: ICD-10-CM

## 2024-05-08 PROCEDURE — 99214 OFFICE O/P EST MOD 30 MIN: CPT | Performed by: ALLERGY & IMMUNOLOGY

## 2024-05-08 PROCEDURE — 1159F MED LIST DOCD IN RCRD: CPT | Performed by: ALLERGY & IMMUNOLOGY

## 2024-05-08 NOTE — PROGRESS NOTES
Subjective   Patient ID:   [id number]   Georgie Pena is a 64 y.o. female who presents for Sleep Apnea.    Chief Complaint   Patient presents with    Sleep Apnea          HPI  This patient is here to evaluate for:    She was sent a new purcell machine    This patient's last visit was I increased the pressure from fixed 8cm to 8-11cm     Waking up rested, better than before.   No reported snoring or apneas but sleeps alone.     This patient denies any sleepiness while driving or any accidents related to sleepiness. There is no report of nodding at stop lights/signs, crossing center lines, or motor vehicle accidents due to sleepiness.     Insomnia - she had worked with sleep behavioral   Has a regime and did the sleep restriction.  If it gets bad, she knows what to do.     With the insomnia, she is doing fairly well  It waxes and wanes.   she used to see Dr. Lopez  last appt was on zoom.      on cpap 8cm  flex 2  usage 5:44  AHI is 13 (too high)     pt reports:  bedtime 3am and up at 10, sleep restirction.  for a few weeks  adjusted the routine.  she didn't need cbt anymore, pt reports she was told  now she goes to bed 11-1am and wakes at 8:50am  variable schedule now that she is retired.      rarely naps, did 2x last week but not otherwise for the past 2 months.        Review of Systems   All other systems reviewed and are negative.        Objective     LMP 02/26/2009 (Approximate)      Physical Exam  Constitutional:       Appearance: Normal appearance.   HENT:      Head: Normocephalic and atraumatic.   Eyes:      Extraocular Movements: Extraocular movements intact.      Conjunctiva/sclera: Conjunctivae normal.      Pupils: Pupils are equal, round, and reactive to light.   Pulmonary:      Effort: Pulmonary effort is normal.   Musculoskeletal:      Cervical back: Normal range of motion.   Skin:     Findings: No rash.   Neurological:      Mental Status: She is alert and oriented to person, place, and time. Mental  status is at baseline.   Psychiatric:         Mood and Affect: Mood normal.         Behavior: Behavior normal.         Thought Content: Thought content normal.         Judgment: Judgment normal.            Current Outpatient Medications   Medication Sig Dispense Refill    aspirin 325 mg EC tablet Take 1 tablet (325 mg) by mouth 2 times a day.      atorvastatin (Lipitor) 10 mg tablet Take 1 tablet (10 mg) by mouth once daily. 30 tablet 11    baclofen (Lioresal) 10 mg tablet Take by mouth every 8 hours if needed.      bisacodyl (Dulcolax) 5 mg EC tablet Take 2 tablets (10 mg) by mouth once daily.      busPIRone (Buspar) 5 mg tablet Take 1 tablet (5 mg) by mouth once daily.      chlorhexidine (Peridex) 0.12 % solution RINSE with 1 teaspoonful twice a day after PROCEDURE and DO NOT swallow      cholecalciferol (Vitamin D-3) 50 MCG (2000 UT) tablet Take 3 tablets (6,000 Units) by mouth once daily.      docusate sodium (Colace) 100 mg capsule Take 1 capsule (100 mg) by mouth 2 times a day.      DULoxetine (Cymbalta) 60 mg DR capsule TAKE 1 CAPSULE(60 MG) BY MOUTH EVERY DAY 30 capsule 2    fish oil concentrate (Omega-3) 120-180 mg capsule Take 4 capsules (4 g) by mouth once daily.      glucosamine-chondroitin 250-200 mg tablet Take by mouth.      glucosamine-D3-Boswellia serr 1,500-400-100 mg-unit-mg tablet Take 1 tablet by mouth twice a day.      ibuprofen 200 mg tablet Take 1 tablet (200 mg) by mouth 3 times a day. with food or milk      melatonin 5 mg tablet Take 1-2 tablets (5-10 mg) by mouth as needed at bedtime (insomnia).      multivitamin tablet Take 1 tablet by mouth once daily.      omega-3 acid ethyl esters (Lovaza) 1 gram capsule Take by mouth once daily.      traMADol ER (Ultram-ER) 100 mg 24 hr tablet Take by mouth.      zaleplon (Sonata) 5 mg capsule Take by mouth.       No current facility-administered medications for this visit.       Summary of the labs over the past 6 months:    Lab on 01/31/2024    Component Date Value Ref Range Status    Thyroid Stimulating Hormone 01/31/2024 4.05  0.27 - 4.20 mIU/L Final    Glucose 01/31/2024 107 (H)  65 - 99 mg/dL Final    Sodium 01/31/2024 142  133 - 145 mmol/L Final    Potassium 01/31/2024 4.7  3.4 - 5.1 mmol/L Final    Chloride 01/31/2024 104  97 - 107 mmol/L Final    Bicarbonate 01/31/2024 25  24 - 31 mmol/L Final    Urea Nitrogen 01/31/2024 13  8 - 25 mg/dL Final    Creatinine 01/31/2024 0.90  0.40 - 1.60 mg/dL Final    eGFR 01/31/2024 71  >60 mL/min/1.73m*2 Final    Calcium 01/31/2024 9.7  8.5 - 10.4 mg/dL Final    Anion Gap 01/31/2024 13  <=19 mmol/L Final   Lab on 01/19/2024   Component Date Value Ref Range Status    WBC 01/19/2024 5.6  4.4 - 11.3 x10*3/uL Final    nRBC 01/19/2024 0.0  0.0 - 0.0 /100 WBCs Final    RBC 01/19/2024 4.65  4.00 - 5.20 x10*6/uL Final    Hemoglobin 01/19/2024 15.0  12.0 - 16.0 g/dL Final    Hematocrit 01/19/2024 45.8  36.0 - 46.0 % Final    MCV 01/19/2024 99  80 - 100 fL Final    MCH 01/19/2024 32.3  26.0 - 34.0 pg Final    MCHC 01/19/2024 32.8  32.0 - 36.0 g/dL Final    RDW 01/19/2024 13.1  11.5 - 14.5 % Final    Platelets 01/19/2024 188  150 - 450 x10*3/uL Final    Glucose 01/19/2024 102 (H)  65 - 99 mg/dL Final    Sodium 01/19/2024 139  133 - 145 mmol/L Final    Potassium 01/19/2024 4.9  3.4 - 5.1 mmol/L Final    Chloride 01/19/2024 101  97 - 107 mmol/L Final    Bicarbonate 01/19/2024 25  24 - 31 mmol/L Final    Urea Nitrogen 01/19/2024 14  8 - 25 mg/dL Final    Creatinine 01/19/2024 0.80  0.40 - 1.60 mg/dL Final    eGFR 01/19/2024 82  >60 mL/min/1.73m*2 Final    Calcium 01/19/2024 9.7  8.5 - 10.4 mg/dL Final    Albumin 01/19/2024 4.5  3.5 - 5.0 g/dL Final    Alkaline Phosphatase 01/19/2024 75  35 - 125 U/L Final    Total Protein 01/19/2024 6.8  5.9 - 7.9 g/dL Final    AST 01/19/2024 28  5 - 40 U/L Final    Bilirubin, Total 01/19/2024 0.4  0.1 - 1.2 mg/dL Final    ALT 01/19/2024 36  5 - 40 U/L Final    Anion Gap 01/19/2024 13  <=19  mmol/L Final    WBC, Urine 01/19/2024 NONE  1-5, NONE /HPF Final    RBC, Urine 01/19/2024 1-2  NONE, 1-2, 3-5 /HPF Final    Squamous Epithelial Cells, Urine 01/19/2024 1-9 (SPARSE)  Reference range not established. /HPF Final    Bacteria, Urine 01/19/2024 1+ (A)  NONE SEEN /HPF Final    Cholesterol 01/19/2024 215 (H)  133 - 200 mg/dL Final    HDL-Cholesterol 01/19/2024 46.0 (L)  >50.0 mg/dL Final    Cholesterol/HDL Ratio 01/19/2024 4.7  SEE COMMENT Final    LDL Calculated 01/19/2024 128  65 - 130 mg/dL Final    Triglycerides 01/19/2024 207 (H)  40 - 150 mg/dL Final    Hepatitis C AB 01/19/2024 Nonreactive  Nonreactive Final    HIV 1/2 Antigen/Antibody Screen wi* 01/19/2024 Nonreactive  Nonreactive Final    Vitamin D, 25-Hydroxy, Total 01/19/2024 52  31 - 100 ng/mL Final         Assessment/Plan   Diagnoses and all orders for this visit:  Obstructive sleep apnea syndrome  Excessive daytime sleepiness  Chronic insomnia    The Obstructive sleep apnea is well treated with CPAP usage and the usage (compliance) of CPAP is good. This patient is benefiting from using PAP and it is medically necessary. Recommend continuing the current plan and machine settings.    Obstructive sleep apnea (DEINSE) is a risk factor for impairing one's ability to function on daily activities such as driving, focus and mental well-being.  And DENISE is a risk factor and can lead to chronic health conditions and cardiovascular complications such as heart disease, high blood pressure, diabetes, heart attacks and stroke. CPAP has been shown to treat and prevent these conditions by treating the obstructive sleep apnea. This patient has excessive daytime sleepiness   and we are addressing its treatment with CPAP. The DENISE is WELL CONTROLLED and management with CPAP is reducing these risks.      Follow-up in 12 months so we may re-assess you and develop a more long term plan. This patient will bring the machine/power cord so we can check the efficacy and  adherence data and adjust treatment as needed.          Zaid Nj MD

## 2024-05-10 ENCOUNTER — HOSPITAL ENCOUNTER (OUTPATIENT)
Dept: RADIOLOGY | Facility: HOSPITAL | Age: 64
Discharge: HOME | End: 2024-05-10
Payer: MEDICARE

## 2024-05-10 VITALS — HEIGHT: 62 IN | BODY MASS INDEX: 43.24 KG/M2 | WEIGHT: 235 LBS

## 2024-05-10 DIAGNOSIS — N95.8 OTHER SPECIFIED MENOPAUSAL AND PERIMENOPAUSAL DISORDERS: ICD-10-CM

## 2024-05-10 DIAGNOSIS — Z13.820 SCREENING FOR OSTEOPOROSIS: ICD-10-CM

## 2024-05-10 DIAGNOSIS — Z12.31 VISIT FOR SCREENING MAMMOGRAM: ICD-10-CM

## 2024-05-10 PROCEDURE — 77067 SCR MAMMO BI INCL CAD: CPT

## 2024-05-10 PROCEDURE — 77080 DXA BONE DENSITY AXIAL: CPT | Performed by: RADIOLOGY

## 2024-05-10 PROCEDURE — 77067 SCR MAMMO BI INCL CAD: CPT | Performed by: STUDENT IN AN ORGANIZED HEALTH CARE EDUCATION/TRAINING PROGRAM

## 2024-05-10 PROCEDURE — 77063 BREAST TOMOSYNTHESIS BI: CPT | Performed by: STUDENT IN AN ORGANIZED HEALTH CARE EDUCATION/TRAINING PROGRAM

## 2024-05-10 PROCEDURE — 77080 DXA BONE DENSITY AXIAL: CPT

## 2024-05-15 DIAGNOSIS — Z76.0 MEDICATION REFILL: ICD-10-CM

## 2024-05-15 RX ORDER — DULOXETIN HYDROCHLORIDE 60 MG/1
CAPSULE, DELAYED RELEASE ORAL
Qty: 30 CAPSULE | Refills: 2 | Status: SHIPPED | OUTPATIENT
Start: 2024-05-15

## 2024-05-15 NOTE — PROGRESS NOTES
This is a 64 year old female for FOLLOW UP VISIT for HYPERLIPIDEMIA as elected not to increase statin at last visit.  Had CT CARDIAC CALCIUM score of 0 and will her previous issues with fatigue and SOB are resolved with adjustment on CPAP habits.      Had C SCOPE in 2022     Takes duloxetine and has had refill and has known depression but in past has taken busirone as well for anxiety    NO THOUGHTS of SELF or OTHER HARM and no depression currently    Has also requested referral to AUDIOLOGY and is URGED to see NEURO because the request is due to BALANCE ISSUES          ROS is NEG for HEADACHE, NAUSEA, VOMITING, DIARRHEA, CHEST PAIN, SOB, and BLEEDING and as further REVIEWED BELOW.    Subjective   Georgie Pena is a 64 y.o. female who presents for med review and Follow-up.    HPI:    Per nursing intake, pt here for med review and Follow-up       Review of systems is essentially negative for all systems except for any identified issues in HPI above.    Objective     /80   Pulse 67   Temp 36.2 °C (97.2 °F)   Wt 108 kg (237 lb)   LMP 02/26/2009 (Approximate)   SpO2 98%   BMI 43.35 kg/m²      Physical Examination:       GENERAL           General Appearance: well-appearing, well-developed, well-hydrated, well-nourished, no acute distress.        HEENT           NECK supple, no masses or thyromegaly, no carotid bruit.        EYES           Extraocular Movements: normal, bilateral eyes YEIMI, no conjunctival injection.        HEART           Rate and Rhythm regular rate and rhythm. Heart sounds: normal S1S2, no S3 or S4. Murmurs: none.        CHEST           Breath sounds: Clear to IPPA, RR<16 no use of accessory muscles.        ABDOMEN           General: Neg for LKKS or masses, no scleral icterus or jaundice.        MUSCULOSKELETAL           Joints Demonstration: Neg for erythema, swelling or joint deformities. gross abnormalities no gross abnormalities.        EXTREMITIES           Lower Extremities: Neg  for cyanosis, clubbing or edema.   Stable VENOUS INSUFFICIENCY      Assessment/Plan   Problem List Items Addressed This Visit       Fatigue    Venous insufficiency of both lower extremities    Anxiety    Relevant Orders    Referral to Psychiatry     Other Visit Diagnoses       Encounter for medication review    -  Primary    SOB (shortness of breath)        Health counseling        Immunization counseling        History of chronic fatigue        History of dyspnea        Depression, unspecified depression type        Relevant Orders    Referral to Psychiatry    Insomnia, unspecified type        Balance problem        Relevant Orders    Referral to Neurology    Referral to ENT            FOLLOW UP:  PRN and as specified above         Elza Gil M.D.

## 2024-05-16 ENCOUNTER — LAB (OUTPATIENT)
Dept: LAB | Facility: LAB | Age: 64
End: 2024-05-16
Payer: MEDICARE

## 2024-05-16 ENCOUNTER — OFFICE VISIT (OUTPATIENT)
Dept: PRIMARY CARE | Facility: CLINIC | Age: 64
End: 2024-05-16
Payer: MEDICARE

## 2024-05-16 VITALS
TEMPERATURE: 97.2 F | OXYGEN SATURATION: 98 % | DIASTOLIC BLOOD PRESSURE: 80 MMHG | BODY MASS INDEX: 43.35 KG/M2 | HEART RATE: 67 BPM | WEIGHT: 237 LBS | SYSTOLIC BLOOD PRESSURE: 110 MMHG

## 2024-05-16 DIAGNOSIS — Z71.85 IMMUNIZATION COUNSELING: ICD-10-CM

## 2024-05-16 DIAGNOSIS — R06.02 SOB (SHORTNESS OF BREATH): ICD-10-CM

## 2024-05-16 DIAGNOSIS — R26.89 BALANCE PROBLEM: ICD-10-CM

## 2024-05-16 DIAGNOSIS — Z87.898 HISTORY OF DYSPNEA: ICD-10-CM

## 2024-05-16 DIAGNOSIS — Z71.9 HEALTH COUNSELING: ICD-10-CM

## 2024-05-16 DIAGNOSIS — E78.5 HYPERLIPIDEMIA LDL GOAL <130: ICD-10-CM

## 2024-05-16 DIAGNOSIS — F41.9 ANXIETY: ICD-10-CM

## 2024-05-16 DIAGNOSIS — Z79.899 ENCOUNTER FOR MEDICATION REVIEW: Primary | ICD-10-CM

## 2024-05-16 DIAGNOSIS — I87.2 VENOUS INSUFFICIENCY OF BOTH LOWER EXTREMITIES: ICD-10-CM

## 2024-05-16 DIAGNOSIS — Z87.898 HISTORY OF CHRONIC FATIGUE: ICD-10-CM

## 2024-05-16 DIAGNOSIS — G47.00 INSOMNIA, UNSPECIFIED TYPE: ICD-10-CM

## 2024-05-16 DIAGNOSIS — F32.A DEPRESSION, UNSPECIFIED DEPRESSION TYPE: ICD-10-CM

## 2024-05-16 DIAGNOSIS — R53.83 OTHER FATIGUE: ICD-10-CM

## 2024-05-16 LAB
ALBUMIN SERPL-MCNC: 4.4 G/DL (ref 3.5–5)
ALP BLD-CCNC: 77 U/L (ref 35–125)
ALT SERPL-CCNC: 28 U/L (ref 5–40)
AST SERPL-CCNC: 24 U/L (ref 5–40)
BILIRUB DIRECT SERPL-MCNC: <0.2 MG/DL (ref 0–0.2)
BILIRUB SERPL-MCNC: 0.6 MG/DL (ref 0.1–1.2)
CHOLEST SERPL-MCNC: 149 MG/DL (ref 133–200)
CHOLEST/HDLC SERPL: 3.2 {RATIO}
HDLC SERPL-MCNC: 46 MG/DL
LDLC SERPL CALC-MCNC: 71 MG/DL (ref 65–130)
PROT SERPL-MCNC: 6.5 G/DL (ref 5.9–7.9)
TRIGL SERPL-MCNC: 158 MG/DL (ref 40–150)

## 2024-05-16 PROCEDURE — 36415 COLL VENOUS BLD VENIPUNCTURE: CPT

## 2024-05-16 PROCEDURE — 99214 OFFICE O/P EST MOD 30 MIN: CPT | Performed by: FAMILY MEDICINE

## 2024-05-16 PROCEDURE — 80061 LIPID PANEL: CPT

## 2024-05-16 PROCEDURE — 1036F TOBACCO NON-USER: CPT | Performed by: FAMILY MEDICINE

## 2024-05-16 PROCEDURE — 1126F AMNT PAIN NOTED NONE PRSNT: CPT | Performed by: FAMILY MEDICINE

## 2024-05-16 PROCEDURE — 80076 HEPATIC FUNCTION PANEL: CPT

## 2024-05-16 PROCEDURE — 90677 PCV20 VACCINE IM: CPT | Performed by: FAMILY MEDICINE

## 2024-05-16 PROCEDURE — 1159F MED LIST DOCD IN RCRD: CPT | Performed by: FAMILY MEDICINE

## 2024-05-16 ASSESSMENT — PATIENT HEALTH QUESTIONNAIRE - PHQ9
2. FEELING DOWN, DEPRESSED OR HOPELESS: NOT AT ALL
SUM OF ALL RESPONSES TO PHQ9 QUESTIONS 1 AND 2: 0
1. LITTLE INTEREST OR PLEASURE IN DOING THINGS: NOT AT ALL

## 2024-05-16 ASSESSMENT — COLUMBIA-SUICIDE SEVERITY RATING SCALE - C-SSRS
2. HAVE YOU ACTUALLY HAD ANY THOUGHTS OF KILLING YOURSELF?: NO
6. HAVE YOU EVER DONE ANYTHING, STARTED TO DO ANYTHING, OR PREPARED TO DO ANYTHING TO END YOUR LIFE?: NO
1. IN THE PAST MONTH, HAVE YOU WISHED YOU WERE DEAD OR WISHED YOU COULD GO TO SLEEP AND NOT WAKE UP?: NO

## 2024-05-16 ASSESSMENT — PAIN SCALES - GENERAL: PAINLEVEL: 0-NO PAIN

## 2024-05-23 ENCOUNTER — APPOINTMENT (OUTPATIENT)
Dept: OPHTHALMOLOGY | Facility: CLINIC | Age: 64
End: 2024-05-23
Payer: MEDICARE

## 2024-08-19 ENCOUNTER — CLINICAL SUPPORT (OUTPATIENT)
Dept: AUDIOLOGY | Facility: CLINIC | Age: 64
End: 2024-08-19
Payer: MEDICARE

## 2024-08-19 ENCOUNTER — APPOINTMENT (OUTPATIENT)
Dept: OTOLARYNGOLOGY | Facility: CLINIC | Age: 64
End: 2024-08-19
Payer: MEDICARE

## 2024-08-19 VITALS — TEMPERATURE: 96.4 F | WEIGHT: 239 LBS | BODY MASS INDEX: 43.98 KG/M2 | HEIGHT: 62 IN

## 2024-08-19 DIAGNOSIS — M43.6 NECK STIFFNESS: ICD-10-CM

## 2024-08-19 DIAGNOSIS — H90.3 SENSORINEURAL HEARING LOSS (SNHL) OF BOTH EARS: Primary | ICD-10-CM

## 2024-08-19 DIAGNOSIS — R26.89 BALANCE PROBLEM: ICD-10-CM

## 2024-08-19 DIAGNOSIS — M25.611 SHOULDER JOINT STIFFNESS, BILATERAL: ICD-10-CM

## 2024-08-19 DIAGNOSIS — H93.13 TINNITUS OF BOTH EARS: ICD-10-CM

## 2024-08-19 DIAGNOSIS — M26.609 TMJ DYSFUNCTION: ICD-10-CM

## 2024-08-19 DIAGNOSIS — R42 VERTIGO: Primary | ICD-10-CM

## 2024-08-19 DIAGNOSIS — H90.3 SENSORINEURAL HEARING LOSS (SNHL) OF BOTH EARS: ICD-10-CM

## 2024-08-19 DIAGNOSIS — M25.612 SHOULDER JOINT STIFFNESS, BILATERAL: ICD-10-CM

## 2024-08-19 PROCEDURE — 3008F BODY MASS INDEX DOCD: CPT | Performed by: NURSE PRACTITIONER

## 2024-08-19 PROCEDURE — 92557 COMPREHENSIVE HEARING TEST: CPT | Performed by: AUDIOLOGIST

## 2024-08-19 PROCEDURE — 99204 OFFICE O/P NEW MOD 45 MIN: CPT | Performed by: NURSE PRACTITIONER

## 2024-08-19 PROCEDURE — 1159F MED LIST DOCD IN RCRD: CPT | Performed by: NURSE PRACTITIONER

## 2024-08-19 PROCEDURE — 92550 TYMPANOMETRY & REFLEX THRESH: CPT | Performed by: AUDIOLOGIST

## 2024-08-19 PROCEDURE — 1036F TOBACCO NON-USER: CPT | Performed by: NURSE PRACTITIONER

## 2024-08-19 PROCEDURE — 1160F RVW MEDS BY RX/DR IN RCRD: CPT | Performed by: NURSE PRACTITIONER

## 2024-08-19 ASSESSMENT — PATIENT HEALTH QUESTIONNAIRE - PHQ9
2. FEELING DOWN, DEPRESSED OR HOPELESS: NOT AT ALL
1. LITTLE INTEREST OR PLEASURE IN DOING THINGS: NOT AT ALL
SUM OF ALL RESPONSES TO PHQ9 QUESTIONS 1 & 2: 0

## 2024-08-19 NOTE — PROGRESS NOTES
Chief Complaint   Patient presents with    Hearing Loss    Balance Problem       HISTORY:  Georgie Pena, age 64 years, was seen for audiogram in conjunction with otolaryngology appointment on 8/19/2024.  Ms. Pena was last tested October 2022 with mild high frequency sensorineural hearing loss noted both ears.  She continues to note hearing loss as well as constant bilateral tinnitus.  There is no ear pain, ear pressure, middle ear pathology, ear drainage or history of ear surgery.  Please see medical records for complete history.    RESULTS:  Prior to testing both external auditory canals were clear and tympanic membranes visualized    Immittance and acoustic reflexes:  Immittance testing yielded TYPE A tympanograms indicating normal middle ear function both ears  Acoustic reflexes were present 500 - 4000 Hz both ears    Audiogram:  Normal hearing levels were obtained 125 - 2000 Hz with a mild sensorineural hearing loss 3000 - 8000 Hz both ears  Speech reception thresholds obtained at 15 dBHL both ears  Speech discrimination scores were 100% at 50 dBHL    IMPRESSIONS:  Normal middle ear function noted both ears  Normal acoustic reflexes noted both ears  Mild high frequency sensorineural hearing loss both ears  Hearing levels stable since October 2022    RECOMMENDATIONS:  1.  Follow up with otolaryngology  2.  Retest hearing levels annually    time: 1300 - 1321

## 2024-08-19 NOTE — PROGRESS NOTES
"Subjective   Patient ID: Georgie Pena is a 64 y.o. female who presents for Follow-up (Balance issues and hearing test results.).  HPI  This patient is referred for evaluation of  episodic vertiginous positional dizziness. The patient is not accompanied by anyone. The approximate duration of her complaints is months.    The patient describes her dizziness initially as imbalance for the past 3 to 4 months but upon further questioning she endorses positionally triggered vertigo as well.  In general, she feels that her imbalance has significantly improved without any intervention.  Her last positional vertiginous episode was more than a month ago.  She believes much of her issue is due to need for a left cataract surgery.  Her right ear has been done already but is told that her right is not ready yet.  She reports that due to the cataracts she has trouble visualizing kimberly with a busy pattern or an uneven surface.  She had 2 falls over the winter and has a hard time getting back up due to history of bilateral knee replacements.  She also reports that her roommate says that she turns the TV too loud.  Patient complains of positive bilateral tinnitus that has \"always been there.\"  She endorses chronic neck and shoulder stiffness as well.    When asked about a significant past otological history including history of prior ear surgery, noise exposure, exposure to ototoxic drugs or agents, and/or family history of hearing loss, the patient admits to recreational noise exposure and mother with presbycusis.    Review of Systems  A comprehensive or 10 points review of the patient's constitutional, neurological, HEENT, pulmonary, cardiovascular and genito-urinary systems showed only those mentioned in history of present illness.    Objective   Physical Exam  Constitutional: no fever, chills, weight loss or weight gain   General appearance: Appears well, well-nourished, well groomed. No acute distress.   Communication: " Normal communication   Psychiatric: Oriented to person, place and time. Normal mood and affect.   Neurologic: Cranial nerves II-XII grossly intact and symmetric bilaterally.   Head and Face:   Head: Atraumatic with no masses, lesions or scarring.   Face: Normal symmetry, no paralysis, synkinesis or facial tic. No scars or deformities.   TMJ: Bilateral crepitus  Eyes: Conjunctiva not edematous or erythematous   Ears: External inspection of ears with no deformity, scars or masses. Bilateral EACs clear and bilateral TMs intact with no signs of effusions   Nose: External inspection of nose: No nasal lesions, lacerations or scars.   Neck: Normal appearing, symmetric, trachea midline.   Cardiovascular: Examination of peripheral vascular system shows no clubbing or cyanosis.   Respiratory: No respiratory distress increased work of breathing. Inspection of the chest with symmetric chest expansion and normal respiratory effort.   Skin: No rashes in the head or neck  Bedside occulomotor function assessment for ocular pursuits and saccades, spontaneous nystagmus was normal.  Bilateral head thrust negative  Romberg slightly unsteady, patient swaying front to back but able to compensate  Fukuda normal  Tandem gait very unsteady, patient leaning left  [place]-Hallpike deferred since vertigo was not active    My interpretation of the audiogram done today is normal hearing through 3000 Hz sloping to mild sensorineural hearing loss bilaterally.  Excellent word recognition scores and normal tympanograms bilaterally.  Assessment/Plan     This patient presents for initial evaluation of chronic acquired vertigo, bilateral sensorineural hearing loss, bilateral subjective tinnitus, TMJ dysfunction, neck stiffness, bilateral shoulder stiffness.    Reassurance given that otologic exam today is normal.  Audiogram was reviewed in detail.  She is not yet a candidate for hearing amplification.  Since her tinnitus has been present for so long, it is  not likely due to high-pitched sensorineural hearing loss but more likely to be caused by chronic TMJ dysfunction and/or cervicogenic in origin.  The physiology of balance control was explained. The likely possible etiologies were reviewed. We discussed that positionally triggered vertigo is likely caused by BPPV versus cervicogenic in origin.  Since this is not actively happening, I did not recommend testing her today.  She was given a handout on BPPV which includes home Epley maneuver.  I also recommended a referral to vestibular therapy to address chronic neck and shoulder issues as this could be contributing to her balance issues as well and they can treat BPPV if needed.  Patient is in agreement with the plan.  All questions were answered to patient's satisfaction.      45 minutes was spent on this patient's visit. More than 50% of that time was spent in counseling regarding the possible etiologies, test results, treatment options and coordinating care.    This note was created using speech recognition transcription software. Despite proofreading, several typographical errors might be present that might affect the meaning of the content. Please call with any questions.         YONATAN Monsalve-CNP 08/19/24 4:04 PM

## 2024-09-17 ENCOUNTER — CLINICAL SUPPORT (OUTPATIENT)
Dept: PHYSICAL THERAPY | Facility: CLINIC | Age: 64
End: 2024-09-17
Payer: MEDICARE

## 2024-09-17 DIAGNOSIS — M43.6 NECK STIFFNESS: ICD-10-CM

## 2024-09-17 DIAGNOSIS — M25.612 SHOULDER JOINT STIFFNESS, BILATERAL: ICD-10-CM

## 2024-09-17 DIAGNOSIS — H81.12 BENIGN PAROXYSMAL POSITIONAL VERTIGO OF LEFT EAR: Primary | ICD-10-CM

## 2024-09-17 DIAGNOSIS — M25.611 SHOULDER JOINT STIFFNESS, BILATERAL: ICD-10-CM

## 2024-09-17 DIAGNOSIS — R42 VERTIGO: ICD-10-CM

## 2024-09-17 PROCEDURE — 95992 CANALITH REPOSITIONING PROC: CPT | Mod: GP | Performed by: PHYSICAL THERAPIST

## 2024-09-17 PROCEDURE — 97161 PT EVAL LOW COMPLEX 20 MIN: CPT | Mod: GP | Performed by: PHYSICAL THERAPIST

## 2024-09-17 ASSESSMENT — ENCOUNTER SYMPTOMS
DEPRESSION: 0
LOSS OF SENSATION IN FEET: 0
OCCASIONAL FEELINGS OF UNSTEADINESS: 1

## 2024-09-17 ASSESSMENT — PAIN - FUNCTIONAL ASSESSMENT: PAIN_FUNCTIONAL_ASSESSMENT: 0-10

## 2024-09-17 ASSESSMENT — PAIN SCALES - GENERAL: PAINLEVEL_OUTOF10: 0 - NO PAIN

## 2024-09-17 NOTE — PROGRESS NOTES
Vestibular Evaluation    Patient Name: Georgie Pena  MRN: [id number]  Today's Date: 09/17/24  Low complexity due to patient's clinical presentation being stable and uncomplicated by any significant comorbidities that may affect rehab tolerance and progression.  Time Calculation  Start Time: 1002  Stop Time: 1042  Time Calculation (min): 40 min  PT Modalities Time Entry  Canalith Repositioning Time Entry: 20     Insurance:  Visit number: 1 of 6  Authorization info: NO AUTH / DEDUCT not met / 80% COINS / [place] VISITS / $0 used 2024 PT/ST   Insurance Type: Payor: MEDICARE / Plan: MEDICARE PART A AND B / Product Type: *No Product type* /     Current Problem:   1. Benign paroxysmal positional vertigo of left ear  Follow Up In Physical Therapy      2. Vertigo  Referral to Physical Therapy    Follow Up In Physical Therapy      3. Neck stiffness  Referral to Physical Therapy    Follow Up In Physical Therapy      4. Shoulder joint stiffness, bilateral  Referral to Physical Therapy          Subjective   General Visit Information:  General  Reason for Referral: Vertigo  Referred By: Sachi Kelsey  Past Medical History Relevant to Rehab: R eye cataract surgery; L eye cataracts  General Comment: Pt reports she was referred to this clinic for balance. She reports that she has trouble with uneven surface and surfaces that look uneven as well as stairs. She does report a fall in April but doesn't remember what occured during the fall, thinks she might have tripped. She does not report any injuries with the fall. She reports that she has had a little bit of dizziness in the past couple months, only monday night she was laying in bed, reading a book and the room started spinning, she reports that she did not turn over she was just laying supine, lasted for maybe 10 seconds. She does not report any significant history of concussions. She feels that she carries a lot of stress in her neck but doesn't report any significant  deficits.  Precautions:  Precautions  Precautions Comment: Vestibular/balance  Pain:  Pain Assessment  Pain Assessment: 0-10  0-10 (Numeric) Pain Score: 0 - No pain  Home Living:   Lives with her roommate; house with basement, 3 steps into the home with handrail and grab bar.   Prior Level of Function:  Level of [place]: Independent with ADLs and functional transfers, Independent with homemaking with ambulation (Driving normal)    Objective     Neuro Oculomotor:  Range of Motion: Normal  Smooth Pursuit: Other: (comment) (nystagmus noted with horizontal toward R)  Horizontal Saccades: Normal  Vertical Saccades: Normal  Eye Alignment : Normal  Distraction Cover: Normal  Distraction Uncover: Normal  Convergence: Normal  Neuro Vestibular:  Horizontal VOR: Negative  Vertical VOR: Negative  R head thrust: Negative  L head thrust: Negative  Spontaneous Nystagmus: Absent  Gaze Evoked Nystagmus: Absent  Positional Testing:  Blackwell-Halpike Right: negative, Other(comment): (Pt reported slight sensation of dizziness however no nystagmus this date)  Vinh-Halpike Left: positive, left torsional  Horizontal Roll Test Right: negative  Horizontal Roll Test Left: negative    Firm surface:   Romberg WNL  Tandem WNL  SLS WNL  Foam surface:  Romberg Increased sway EC; EO WNL    Outcome Measures:  Other Measures  Dizziness Handicap Inventory: 22/24     Treatments:  Balance/Neuromuscular Re-Education  Balance/Neuromuscular Re-Education Activity Performed: Yes  Balance/Neuromuscular Re-Education Activity 1: Epley L x2  Balance/Neuromuscular Re-Education Activity 2: Educated on post epley precautions    OP EDUCATION:  Outpatient Education  Individual(s) Educated: Patient  Education Provided: Fall Risk, Anatomy, Home Exercise Program, Home Safety, POC  Risk and Benefits Discussed with Patient/Caregiver/Other: yes  Patient/Caregiver Demonstrated Understanding: yes  Plan of Care Discussed and Agreed Upon: yes  Patient Response to Education:  Patient/Caregiver Verbalized Understanding of Information, Patient/Caregiver Asked Appropriate Questions    Assessment   Assessment:   PT Assessment Results: Impaired balance  Rehab Prognosis: Good  Assessment Comment: Pt is a 64 y.o female presenting to therapy with balance concerns. Found to have positive findings of L sided posterior BPPV. No other abnormal findings within vestibular system were noted this date. Pt would benefit from skilled physical therapy in order to clear BPPV and prevent further functional decline as well as reduced fall risk.      Plan:  Treatment/Interventions: Canalith repositioning, Dry needling, Education/ Instruction, Hot pack, Gait training, Manual therapy, Neuromuscular re-education, Taping techniques, Therapeutic activities, Therapeutic exercises  PT Plan: Skilled PT  PT Frequency: 1 time per week  Duration: 6 weeks  Onset Date: 09/17/24  Certification Period Start Date: 09/17/24  Certification Period End Date: 12/16/24  Number of Treatments Authorized: [place]  Rehab Potential: Good  Plan of Care Agreement: Patient    Goals:  Active       PT Problem       PT Goal 1       Start:  09/17/24    Expected End:  11/16/24       Pt will be 100% IND with HEP in 6 weeks in order to maintain progress with therapy.           PT Goal 2       Start:  09/17/24    Expected End:  11/16/24       Pt will improve DGI score to 24/24 in 6 weeks in order to reduce fall risk.          PT Goal 3       Start:  09/17/24    Expected End:  11/16/24       Pt will improve DHI self-reported score to 5 in 6 weeks in order to demonstrate improvement with functional tasks including cooking, dressing, bathing, transfers and ambulation           PT Goal 4       Start:  09/17/24    Expected End:  11/16/24       Pt will no longer test positive with BPPV testing in 6 weeks to reduce fall risk.

## 2024-09-24 ENCOUNTER — TREATMENT (OUTPATIENT)
Dept: PHYSICAL THERAPY | Facility: CLINIC | Age: 64
End: 2024-09-24
Payer: MEDICARE

## 2024-09-24 DIAGNOSIS — R42 VERTIGO: ICD-10-CM

## 2024-09-24 DIAGNOSIS — H81.12 BENIGN PAROXYSMAL POSITIONAL VERTIGO OF LEFT EAR: ICD-10-CM

## 2024-09-24 DIAGNOSIS — M43.6 NECK STIFFNESS: ICD-10-CM

## 2024-09-24 PROCEDURE — 97112 NEUROMUSCULAR REEDUCATION: CPT | Mod: GP,CQ

## 2024-09-24 ASSESSMENT — PAIN - FUNCTIONAL ASSESSMENT: PAIN_FUNCTIONAL_ASSESSMENT: 0-10

## 2024-09-24 ASSESSMENT — PAIN SCALES - GENERAL: PAINLEVEL_OUTOF10: 0 - NO PAIN

## 2024-09-24 NOTE — PROGRESS NOTES
"Physical Therapy Treatment    Patient Name: Georgie Pena  MRN: [id number]  Today's Date: 9/24/2024  Time Calculation  Start Time: 1015  Stop Time: 1042  Time Calculation (min): 27 min  PT Therapeutic Procedures Time Entry  Neuromuscular Re-Education Time Entry: 27    Insurance:  Visit number: 2 of 6  Authorization info: NO AUTH / DEDUCT not met / 80% [employer] / [place] / $0 used 2024 PT/ST   Insurance Type: Payor: MEDICARE / Plan: MEDICARE PART A AND B / Product Type: *No Product type* /     Current Problem   1. Vertigo  Follow Up In Physical Therapy      2. Neck stiffness  Follow Up In Physical Therapy      3. Benign paroxysmal positional vertigo of left ear  Follow Up In Physical Therapy          Subjective   General    Patient arrives to therapy today stating she has not had any instances of dizziness since epley was performed on eval. Does have trouble with generalized balance when walking on grass and going up and down stairs. Worried about losing her balance and falling.     Precautions:   Dizziness, fall risk    Pain   Pain Assessment: 0-10  0-10 (Numeric) Pain Score: 0 - No pain    Post Treatment Pain Level   none    Objective   Unsteady with all dynamic movement balance tasks     Occasional LOB during cone taps and romberg>> 3/4 tandem     Treatments:    Neuro Re-ed:   Balance/Neuromuscular Re-Education  Balance/Neuromuscular Re-Education Activity Performed: Yes  Balance/Neuromuscular Re-Education Activity 1: air ex stance 3 x 30\"  Balance/Neuromuscular Re-Education Activity 2: air ex with EC 3 x 30\"  Balance/Neuromuscular Re-Education Activity 3: cone toe taps B 1 UE assist 3 x 20 ea  Balance/Neuromuscular Re-Education Activity 4: romberg stances 3 x 45\" H  Balance/Neuromuscular Re-Education Activity 5: romber>>3/4 tandom 10\" H x10 ea      Assessment   Assessment:    Patient arrived to therapy without dizziness, reports of no dizziness s/s since performance of epley at evaluation. Opted for focus on " generalized balance this date with good tolerance and participation. Unsteadiness throughout with increased difficulty without UE assist. Will progress appropriately.     Plan:    See above    OP EDUCATION:   Updated HEP: Access Code: [email or web address]  URL: [email or web address]  Date: 09/24/2024  Prepared by: Johanny Blackburn    Exercises  - Romberg Stance  - 1 x daily - 7 x weekly - 3 sets - 10 reps  - Romberg Stance with Eyes Closed  - 1 x daily - 7 x weekly - 3 sets - 10 reps  - Standing Romberg to 3/4 Tandem Stance  - 1 x daily - 7 x weekly - 3 sets - 10 reps    Goals:   Active       PT Problem       PT Goal 1       Start:  09/17/24    Expected End:  11/16/24       Pt will be 100% IND with HEP in 6 weeks in order to maintain progress with therapy.           PT Goal 2       Start:  09/17/24    Expected End:  11/16/24       Pt will improve DGI score to 24/24 in 6 weeks in order to reduce fall risk.          PT Goal 3       Start:  09/17/24    Expected End:  11/16/24       Pt will improve DHI self-reported score to 5 in 6 weeks in order to demonstrate improvement with functional tasks including cooking, dressing, bathing, transfers and ambulation           PT Goal 4       Start:  09/17/24    Expected End:  11/16/24       Pt will no longer test positive with BPPV testing in 6 weeks to reduce fall risk.

## 2024-10-01 ENCOUNTER — TREATMENT (OUTPATIENT)
Dept: PHYSICAL THERAPY | Facility: CLINIC | Age: 64
End: 2024-10-01
Payer: MEDICARE

## 2024-10-01 DIAGNOSIS — M43.6 NECK STIFFNESS: ICD-10-CM

## 2024-10-01 DIAGNOSIS — R42 VERTIGO: ICD-10-CM

## 2024-10-01 DIAGNOSIS — H81.12 BENIGN PAROXYSMAL POSITIONAL VERTIGO OF LEFT EAR: ICD-10-CM

## 2024-10-01 PROCEDURE — 97530 THERAPEUTIC ACTIVITIES: CPT | Mod: GP,CQ

## 2024-10-01 PROCEDURE — 95992 CANALITH REPOSITIONING PROC: CPT | Mod: GP,CQ

## 2024-10-01 ASSESSMENT — PAIN - FUNCTIONAL ASSESSMENT: PAIN_FUNCTIONAL_ASSESSMENT: 0-10

## 2024-10-01 NOTE — PROGRESS NOTES
"Physical Therapy Treatment    Patient Name: Georgie Pena  MRN: [id number]  Today's Date: 10/1/2024  Time Calculation  Start Time: 1355  Stop Time: 1420  Time Calculation (min): 25 min  PT Modalities Time Entry  Canalith Repositioning Time Entry: 15  PT Therapeutic Procedures Time Entry  Therapeutic Activity Time Entry: 10    Insurance:  Visit number: 3 of 6  Authorization info: O AUTH / DEDUCT not met / 80% [employer] / [place] / $0 used 2024 PT/ST   Insurance Type: Payor: MEDICARE / Plan: MEDICARE PART A AND B / Product Type: *No Product type* /     Current Problem   1. Vertigo  Follow Up In Physical Therapy      2. Neck stiffness  Follow Up In Physical Therapy      3. Benign paroxysmal positional vertigo of left ear  Follow Up In Physical Therapy          Subjective   General    Patient 10 min late, still able to accommodate; reports 2 instances of dizziness \"episodes\" this week right when she was getting out of bed which resolved quickly.     Precautions:   L BPPV    Pain   Pain Assessment: 0-10none    Post Treatment Pain Level   none    Objective   (+) L dixhallpike with R upbeating nystagmus (lasting at least 30\")    Treatments:  Therapeutic Exercise:     Therapeutic activity:  Therapeutic Activity  Therapeutic Activity Performed: Yes  Therapeutic Activity 1: Education regarding BPPV diagnosis/tx interventions  Therapeutic Activity 2: Loaded epley maneuver L x3      Assessment   Assessment:    Positive findings still with L ramirez quintana pike epley performed 3x with nystagmus each time. Each lasting at least 30 seconds upon initial drop to first position epley. Will continue to monitor and proceed as appropriate.     Plan:    Assess effects of loaded epley    OP EDUCATION:   Management/precautions post epley    Goals:   Active       PT Problem       PT Goal 1       Start:  09/17/24    Expected End:  11/16/24       Pt will be 100% IND with HEP in 6 weeks in order to maintain progress with therapy.           PT Goal 2  "      Start:  09/17/24    Expected End:  11/16/24       Pt will improve DGI score to 24/24 in 6 weeks in order to reduce fall risk.          PT Goal 3       Start:  09/17/24    Expected End:  11/16/24       Pt will improve DHI self-reported score to 5 in 6 weeks in order to demonstrate improvement with functional tasks including cooking, dressing, bathing, transfers and ambulation           PT Goal 4       Start:  09/17/24    Expected End:  11/16/24       Pt will no longer test positive with BPPV testing in 6 weeks to reduce fall risk.

## 2024-10-07 ENCOUNTER — TREATMENT (OUTPATIENT)
Dept: PHYSICAL THERAPY | Facility: CLINIC | Age: 64
End: 2024-10-07
Payer: MEDICARE

## 2024-10-07 DIAGNOSIS — R42 VERTIGO: ICD-10-CM

## 2024-10-07 DIAGNOSIS — H81.12 BENIGN PAROXYSMAL POSITIONAL VERTIGO OF LEFT EAR: ICD-10-CM

## 2024-10-07 DIAGNOSIS — M43.6 NECK STIFFNESS: ICD-10-CM

## 2024-10-07 PROCEDURE — 97112 NEUROMUSCULAR REEDUCATION: CPT | Mod: GP | Performed by: PHYSICAL THERAPIST

## 2024-10-07 PROCEDURE — 95992 CANALITH REPOSITIONING PROC: CPT | Mod: GP | Performed by: PHYSICAL THERAPIST

## 2024-10-07 ASSESSMENT — PAIN - FUNCTIONAL ASSESSMENT: PAIN_FUNCTIONAL_ASSESSMENT: 0-10

## 2024-10-07 ASSESSMENT — PAIN SCALES - GENERAL: PAINLEVEL_OUTOF10: 0 - NO PAIN

## 2024-10-07 NOTE — PROGRESS NOTES
"Physical Therapy Treatment    Patient Name: Georgie Pena  MRN: [id number]  Today's Date: 10/7/2024  Time Calculation  Start Time: 1452  Stop Time: 1532  Time Calculation (min): 40 min  PT Modalities Time Entry  Canalith Repositioning Time Entry: 10  PT Therapeutic Procedures Time Entry  Neuromuscular Re-Education Time Entry: 30    Insurance:  Visit number: 4 of 6  Authorization info: NO AUTH / DEDUCT not met / 80% COINS / [place] / $0 used 2024 PT/ST   Insurance Type: Payor: MEDICARE / Plan: MEDICARE PART A AND B / Product Type: *No Product type* /     Current Problem   1. Vertigo  Follow Up In Physical Therapy      2. Neck stiffness  Follow Up In Physical Therapy      3. Benign paroxysmal positional vertigo of left ear  Follow Up In Physical Therapy          Subjective   General   Reason for Referral: Vertigo  Referred By: Sachi Kelsey  General Comment: Pt reports minimal vertigo, continues to feel imbalanced when she gets up too fast, tired or walking on \"busy\" carpet. (Arrived late able to accomodate)  Precautions:  Precautions  Precautions Comment: Vestibular/balance  Pain   Pain Assessment: 0-10  0-10 (Numeric) Pain Score: 0 - No pain  Post Treatment Pain Level 0    Objective   + L BPPV   Imbalance with EC     Treatments:    Neuro Re-ed:   Balance/Neuromuscular Re-Education  Balance/Neuromuscular Re-Education Activity Performed: Yes  Balance/Neuromuscular Re-Education Activity 1: Epleyx3 L  Balance/Neuromuscular Re-Education Activity 2: foam pad normal stance EC 30\"x3  Balance/Neuromuscular Re-Education Activity 3: foam pad head turns and nods 10x2  Balance/Neuromuscular Re-Education Activity 4: firm semi-tandem 30\"x3 R/L  Balance/Neuromuscular Re-Education Activity 5: firm surface EC AP WS 30\"  Balance/Neuromuscular Re-Education Activity 6: firm surface EC WS AP 30\"x2  Balance/Neuromuscular Re-Education Activity 7: rocker board neutral control EO 30\"  Balance/Neuromuscular Re-Education Activity 8: rocker " "board [facility] control EC 30\"x2      Assessment   Assessment:   PT Assessment  Assessment Comment: Pt tolerated session well, continues to have positive finding of BPPV along with noted reduction of balance during eye closed activities. Updated HEP due to noted reduction in proprioception with eyes closed.    Plan:    Check BPPV and progress proprioceptive     OP EDUCATION:   Updated HEP with above exercises     Goals:   Active       PT Problem       PT Goal 1       Start:  09/17/24    Expected End:  11/16/24       Pt will be 100% IND with HEP in 6 weeks in order to maintain progress with therapy.           PT Goal 2       Start:  09/17/24    Expected End:  11/16/24       Pt will improve DGI score to 24/24 in 6 weeks in order to reduce fall risk.          PT Goal 3       Start:  09/17/24    Expected End:  11/16/24       Pt will improve DHI self-reported score to 5 in 6 weeks in order to demonstrate improvement with functional tasks including cooking, dressing, bathing, transfers and ambulation           PT Goal 4       Start:  09/17/24    Expected End:  11/16/24       Pt will no longer test positive with BPPV testing in 6 weeks to reduce fall risk.              "

## 2024-10-17 ENCOUNTER — TREATMENT (OUTPATIENT)
Dept: PHYSICAL THERAPY | Facility: CLINIC | Age: 64
End: 2024-10-17
Payer: MEDICARE

## 2024-10-17 DIAGNOSIS — H81.12 BENIGN PAROXYSMAL POSITIONAL VERTIGO OF LEFT EAR: ICD-10-CM

## 2024-10-17 DIAGNOSIS — R42 VERTIGO: ICD-10-CM

## 2024-10-17 DIAGNOSIS — M43.6 NECK STIFFNESS: ICD-10-CM

## 2024-10-17 PROCEDURE — 95992 CANALITH REPOSITIONING PROC: CPT | Mod: GP | Performed by: PHYSICAL THERAPIST

## 2024-10-17 PROCEDURE — 97530 THERAPEUTIC ACTIVITIES: CPT | Mod: GP | Performed by: PHYSICAL THERAPIST

## 2024-10-17 NOTE — PROGRESS NOTES
Physical Therapy Treatment    Patient Name: Georgie Pena  MRN: [id number]  Today's Date: 10/17/2024  Time Calculation  Start Time: 1250  Stop Time: 1320  Time Calculation (min): 30 min  PT Modalities Time Entry  Canalith Repositioning Time Entry: 15  PT Therapeutic Procedures Time Entry  Therapeutic Activity Time Entry: 15    Insurance:  Visit number: 5 of 6  Authorization info: NO AUTH / DEDUCT not met / 80% [employer]INS / [place] / $0 used 2024 PT/ST   Insurance Type: Payor: MEDICARE / Plan: MEDICARE PART A AND B / Product Type: *No Product type* /     Current Problem   1. Vertigo  Follow Up In Physical Therapy      2. Neck stiffness  Follow Up In Physical Therapy      3. Benign paroxysmal positional vertigo of left ear  Follow Up In Physical Therapy          Subjective   General   Reason for Referral: Vertigo  Referred By: Sachi Kelsey  General Comment: Pt reprots she was feeling good until the other day when she layed down and got spinning.  Precautions:  Precautions  Precautions Comment: Vestibular/balance  Pain    0  Post Treatment Pain Level 0    Objective   L BPPV posterior canal    Treatments:    Therapeutic activity:  Therapeutic Activity  Therapeutic Activity Performed: Yes  Therapeutic Activity 1: Educated on self epley at home  Neuro Re-ed:   Balance/Neuromuscular Re-Education  Balance/Neuromuscular Re-Education Activity Performed: Yes  Balance/Neuromuscular Re-Education Activity 1: Epleyx3 L  Balance/Neuromuscular Re-Education Activity 2: Epley R x2      Assessment   Assessment:   PT Assessment  Assessment Comment: Pt demonstrated a large positive finding of L BPPV, cleared with epley and instructed/educated on self epley at home.    Plan:   Reassess next session     OP EDUCATION:   Self epley    Goals:   Active       PT Problem       PT Goal 1       Start:  09/17/24    Expected End:  11/16/24       Pt will be 100% IND with HEP in 6 weeks in order to maintain progress with therapy.           PT Goal 2        Start:  09/17/24    Expected End:  11/16/24       Pt will improve DGI score to 24/24 in 6 weeks in order to reduce fall risk.          PT Goal 3       Start:  09/17/24    Expected End:  11/16/24       Pt will improve DHI self-reported score to 5 in 6 weeks in order to demonstrate improvement with functional tasks including cooking, dressing, bathing, transfers and ambulation           PT Goal 4       Start:  09/17/24    Expected End:  11/16/24       Pt will no longer test positive with BPPV testing in 6 weeks to reduce fall risk.

## 2024-10-21 ENCOUNTER — TREATMENT (OUTPATIENT)
Dept: PHYSICAL THERAPY | Facility: CLINIC | Age: 64
End: 2024-10-21
Payer: MEDICARE

## 2024-10-21 DIAGNOSIS — H81.12 BENIGN PAROXYSMAL POSITIONAL VERTIGO OF LEFT EAR: ICD-10-CM

## 2024-10-21 DIAGNOSIS — R42 VERTIGO: ICD-10-CM

## 2024-10-21 DIAGNOSIS — M43.6 NECK STIFFNESS: ICD-10-CM

## 2024-10-21 PROCEDURE — 97112 NEUROMUSCULAR REEDUCATION: CPT | Mod: GP,CQ

## 2024-10-21 ASSESSMENT — PAIN SCALES - GENERAL: PAINLEVEL_OUTOF10: 1

## 2024-10-21 ASSESSMENT — PAIN - FUNCTIONAL ASSESSMENT: PAIN_FUNCTIONAL_ASSESSMENT: 0-10

## 2024-10-21 NOTE — PROGRESS NOTES
"Physical Therapy Treatment    Patient Name: Georgie Pena  MRN: [id number]  Today's Date: 10/21/2024  Time Calculation  Start Time: 1245  Stop Time: 1314  Time Calculation (min): 29 min  PT Therapeutic Procedures Time Entry  Neuromuscular Re-Education Time Entry: 29    Insurance:  Visit number: 6 of 12  Authorization info: O AUTH / DEDUCT not met / 80% [employer]INS / [place] / $0 used 2024 PT/ST   Insurance Type: Payor: MEDICARE / Plan: MEDICARE PART A AND B / Product Type: *No Product type* /     Current Problem   1. Vertigo  Follow Up In Physical Therapy      2. Neck stiffness  Follow Up In Physical Therapy      3. Benign paroxysmal positional vertigo of left ear  Follow Up In Physical Therapy          Subjective   General    Patient reports that since last session she did not have any symptoms so did not do self epley and then this morning she woke up with \"spinning\" sensation. Did not do self epley since she had therapy appt today. Will feeling unsteady with standing and walking.     Precautions:   BPPV    Pain   Pain Assessment: 0-10 (dizziness)  0-10 (Numeric) Pain Score: 1    Post Treatment Pain Level   No change    Objective   Slight LOB with cone taps and on rocker board     Treatments:    Neuro Re-ed:   Balance/Neuromuscular Re-Education  Balance/Neuromuscular Re-Education Activity Performed: Yes  Balance/Neuromuscular Re-Education Activity 1: L loaded epley 1x  Balance/Neuromuscular Re-Education Activity 2: romberg EC 3 x30\"  Balance/Neuromuscular Re-Education Activity 3: romberg EC alt arms 3 x 30\"  Balance/Neuromuscular Re-Education Activity 4: rocker board EC 3 x 30\"  Balance/Neuromuscular Re-Education Activity 5: rocker board alt arms 10x2  Balance/Neuromuscular Re-Education Activity 6: alt cone taps 3 x 30\"      Assessment   Assessment:    Focused on generalized balance again with good patient tolerance/performance. Slightly challenged by rocker board tasks and with dynamic cone taps with lack of " proprioception noted. Will continue to progress balance tasks to reduce risk of falls.     PTA notified this PT (evaluating therapist) concerning POC, PT did treat pt at last session who did demonstrate continued findings of BPPV and reports of imbalance. PT spoke with pt and both in agreement of continuation of POC, will perform formal reassessment next session however at this time will continue POC 1x/week for 6 more sessions. Leela De Los Santos PT DPT    Plan:    Strengthening, balance    OP EDUCATION:   Review/adjustment of HEP    Goals:   Active       PT Problem       PT Goal 1       Start:  09/17/24    Expected End:  11/16/24       Pt will be 100% IND with HEP in 6 weeks in order to maintain progress with therapy.           PT Goal 2       Start:  09/17/24    Expected End:  11/16/24       Pt will improve DGI score to 24/24 in 6 weeks in order to reduce fall risk.          PT Goal 3       Start:  09/17/24    Expected End:  11/16/24       Pt will improve DHI self-reported score to 5 in 6 weeks in order to demonstrate improvement with functional tasks including cooking, dressing, bathing, transfers and ambulation           PT Goal 4       Start:  09/17/24    Expected End:  11/16/24       Pt will no longer test positive with BPPV testing in 6 weeks to reduce fall risk.

## 2024-10-30 ENCOUNTER — TELEPHONE (OUTPATIENT)
Dept: ALLERGY | Facility: CLINIC | Age: 64
End: 2024-10-30
Payer: MEDICARE

## 2024-11-07 ENCOUNTER — TREATMENT (OUTPATIENT)
Dept: PHYSICAL THERAPY | Facility: CLINIC | Age: 64
End: 2024-11-07
Payer: MEDICARE

## 2024-11-07 DIAGNOSIS — R42 VERTIGO: ICD-10-CM

## 2024-11-07 DIAGNOSIS — H81.12 BENIGN PAROXYSMAL POSITIONAL VERTIGO OF LEFT EAR: ICD-10-CM

## 2024-11-07 DIAGNOSIS — M43.6 NECK STIFFNESS: ICD-10-CM

## 2024-11-07 PROCEDURE — 97112 NEUROMUSCULAR REEDUCATION: CPT | Mod: GP | Performed by: PHYSICAL THERAPIST

## 2024-11-07 NOTE — PROGRESS NOTES
"Physical Therapy Treatment    Patient Name: Georgie Pena  MRN: [id number]  Today's Date: 11/7/2024  Time Calculation  Start Time: 1416  Stop Time: 1454  Time Calculation (min): 38 min  PT Therapeutic Procedures Time Entry  Neuromuscular Re-Education Time Entry: 38    Insurance:  Visit number: 7 of 12  Authorization info: NO AUTH / DEDUCT not met / 80% [employer]INS / [place] / $0 used 2024 PT/ST   Insurance Type: Payor: MEDICARE / Plan: MEDICARE PART A AND B / Product Type: *No Product type* /     Current Problem   1. Vertigo  Follow Up In Physical Therapy      2. Neck stiffness  Follow Up In Physical Therapy      3. Benign paroxysmal positional vertigo of left ear  Follow Up In Physical Therapy          Subjective   General   Reason for Referral: Vertigo  Referred By: Sachi Kelsey  General Comment: Pt reports she worked the [employer] on tuesday which was a long day and had some lightheadiness yesterday but feels well today.  Precautions:  Precautions  Precautions Comment: Vestibular/balance  Pain    0  Post Treatment Pain Level 0    Objective   Posterior displacement noted on rocker board with reduced proprioception during eyes closed     See balance master results     Treatments:    Neuro Re-ed:   Balance/Neuromuscular Re-Education  Balance/Neuromuscular Re-Education Activity Performed: Yes  Balance/Neuromuscular Re-Education Activity 1: NuStep L5 5'  Balance/Neuromuscular Re-Education Activity 2: rocker board neutral control EC 30\"x3  Balance/Neuromuscular Re-Education Activity 3: rocker board neutral with head turns 10x3  Balance/Neuromuscular Re-Education Activity 4: rocker board AP shifts 30\"x3  Balance/Neuromuscular Re-Education Activity 5: balance master assessment  Balance/Neuromuscular Re-Education Activity 6: EC fwd/bwd WS  Balance/Neuromuscular Re-Education Activity 7: EC CW/CCW WS    Assessment   Assessment:    Pt tolerated session well focus on balance and proprioceptive tasks noted reduction with EC and " awareness of COG. Utilized balance master for eduction on WS reduction in anterior direction. Will continue as tolerated.     Plan:    Continue with proprioceptive and higher level balance     OP EDUCATION:   Updated HEP with WS     Goals:   Active       PT Problem       PT Goal 1       Start:  09/17/24    Expected End:  11/16/24       Pt will be 100% IND with HEP in 6 weeks in order to maintain progress with therapy.           PT Goal 2       Start:  09/17/24    Expected End:  11/16/24       Pt will improve DGI score to 24/24 in 6 weeks in order to reduce fall risk.          PT Goal 3       Start:  09/17/24    Expected End:  11/16/24       Pt will improve DHI self-reported score to 5 in 6 weeks in order to demonstrate improvement with functional tasks including cooking, dressing, bathing, transfers and ambulation           PT Goal 4       Start:  09/17/24    Expected End:  11/16/24       Pt will no longer test positive with BPPV testing in 6 weeks to reduce fall risk.

## 2024-11-11 ENCOUNTER — TREATMENT (OUTPATIENT)
Dept: PHYSICAL THERAPY | Facility: CLINIC | Age: 64
End: 2024-11-11
Payer: MEDICARE

## 2024-11-11 DIAGNOSIS — M43.6 NECK STIFFNESS: ICD-10-CM

## 2024-11-11 DIAGNOSIS — H81.12 BENIGN PAROXYSMAL POSITIONAL VERTIGO OF LEFT EAR: ICD-10-CM

## 2024-11-11 DIAGNOSIS — R42 VERTIGO: ICD-10-CM

## 2024-11-11 PROCEDURE — 97112 NEUROMUSCULAR REEDUCATION: CPT | Mod: GP,CQ

## 2024-11-11 ASSESSMENT — PAIN - FUNCTIONAL ASSESSMENT: PAIN_FUNCTIONAL_ASSESSMENT: 0-10

## 2024-11-11 ASSESSMENT — PAIN SCALES - GENERAL: PAINLEVEL_OUTOF10: 0 - NO PAIN

## 2024-11-11 NOTE — PROGRESS NOTES
"Physical Therapy Treatment    Patient Name: Georgie Pena  MRN: [id number]  Today's Date: 11/11/2024  Time Calculation  Start Time: 1130  Stop Time: 1208  Time Calculation (min): 38 min  PT Therapeutic Procedures Time Entry  Neuromuscular Re-Education Time Entry: 38    Insurance:  Visit number: 8 of 12  Authorization info: NO AUTH / DEDUCT not met / 80% [employer]INS / [place] / $0 used 2024 PT/ST   Insurance Type: Payor: MEDICARE / Plan: MEDICARE PART A AND B / Product Type: *No Product type* /     Current Problem   1. Vertigo  Follow Up In Physical Therapy      2. Neck stiffness  Follow Up In Physical Therapy      3. Benign paroxysmal positional vertigo of left ear  Follow Up In Physical Therapy          Subjective   General    Patient reports no new changes since last session. States she feels as though she has not seen much progression and has been compliant with HEP. Reports no falls but still feels \"off balance\".     Precautions:   Fall/vestibular    Pain   Pain Assessment: 0-10  0-10 (Numeric) Pain Score: 0 - No pain    Post Treatment Pain Level   No change    Objective   Mild LOB with rocker board EC    Treatments:    Neuro Re-ed:   Balance/Neuromuscular Re-Education  Balance/Neuromuscular Re-Education Activity Performed: Yes  Balance/Neuromuscular Re-Education Activity 1: NuStep L5 5'  Balance/Neuromuscular Re-Education Activity 2: tandem>>romberg 5\" H B x10  Balance/Neuromuscular Re-Education Activity 3: rocker board weighted ball raises diagonal 2 x 10 ea  Balance/Neuromuscular Re-Education Activity 4: rocker board EC ball raises 10x2  Balance/Neuromuscular Re-Education Activity 5: rocker board VOR cancellation with ball 3 x 30\"  Balance/Neuromuscular Re-Education Activity 6: alt toe taps 6\" 2 x 30\"  Balance/Neuromuscular Re-Education Activity 7: alt toe taps 6\" on air ex 2 x 30\"  Balance/Neuromuscular Re-Education Activity 8: review of HEP      Assessment   Assessment:    Emphasis and focus on higher level " balance training with good performance and tolerance. Little/no LOB throughout all tasks but unsteadiness and occasional small LOB during EC with rocker board requiring ~Jm for support/safety. Will continue to progress appropriately.    Plan:    balance    OP EDUCATION:   Review of HEP    Goals:   Active       PT Problem       PT Goal 1       Start:  09/17/24    Expected End:  11/16/24       Pt will be 100% IND with HEP in 6 weeks in order to maintain progress with therapy.           PT Goal 2       Start:  09/17/24    Expected End:  11/16/24       Pt will improve DGI score to 24/24 in 6 weeks in order to reduce fall risk.          PT Goal 3       Start:  09/17/24    Expected End:  11/16/24       Pt will improve DHI self-reported score to 5 in 6 weeks in order to demonstrate improvement with functional tasks including cooking, dressing, bathing, transfers and ambulation           PT Goal 4       Start:  09/17/24    Expected End:  11/16/24       Pt will no longer test positive with BPPV testing in 6 weeks to reduce fall risk.

## 2024-11-18 DIAGNOSIS — G47.33 OBSTRUCTIVE SLEEP APNEA SYNDROME: Primary | ICD-10-CM

## 2024-11-18 DIAGNOSIS — G47.33 OSA (OBSTRUCTIVE SLEEP APNEA): ICD-10-CM

## 2024-11-18 NOTE — PROGRESS NOTES
[facility] REQUESTED THE ORIGINAL STUDY AND NOTES PRIOR.   SENDING PCP'S NOTE WHO ORDERED SLEEP STUDY as WELL as MY FIRST NOTE FROM 2019.    Subjective   Patient ID:   [id number]   Georgie Pena is a 64 y.o. female who presents for No chief complaint on file..    No chief complaint on file.         HPI  This patient is here to evaluate for:      Review of Systems      Objective     LMP 02/26/2009 (Approximate)      Physical Exam       Current Outpatient Medications   Medication Sig Dispense Refill    aspirin 325 mg EC tablet Take 1 tablet (325 mg) by mouth 2 times a day.      atorvastatin (Lipitor) 10 mg tablet Take 1 tablet (10 mg) by mouth once daily. 30 tablet 11    cholecalciferol (Vitamin D-3) 50 MCG (2000 UT) tablet Take 3 tablets (6,000 Units) by mouth once daily.      DULoxetine (Cymbalta) 60 mg DR capsule TAKE 1 CAPSULE(60 MG) BY MOUTH EVERY DAY 30 capsule 2    fish oil concentrate (Omega-3) 120-180 mg capsule Take 4 capsules (4 g) by mouth once daily.      ibuprofen 200 mg tablet Take 1 tablet (200 mg) by mouth every 8 hours if needed. with food or milk      melatonin 5 mg tablet Take 1-2 tablets (5-10 mg) by mouth as needed at bedtime (insomnia).      multivitamin tablet Take 1 tablet by mouth once daily.      traMADol ER (Ultram-ER) 100 mg 24 hr tablet Take 1 tablet (100 mg) by mouth if needed.       No current facility-administered medications for this visit.       Summary of the labs over the past 6 months:    No visits with results within 6 Month(s) from this visit.   Latest known visit with results is:   Lab on 05/16/2024   Component Date Value Ref Range Status    Cholesterol 05/16/2024 149  133 - 200 mg/dL Final    HDL-Cholesterol 05/16/2024 46.0 (L)  >50.0 mg/dL Final    Cholesterol/HDL Ratio 05/16/2024 3.2  SEE COMMENT Final    LDL Calculated 05/16/2024 71  65 - 130 mg/dL Final    Triglycerides 05/16/2024 158 (H)  40 - 150 mg/dL Final    AST 05/16/2024 24   5 - 40 U/L Final    ALT 05/16/2024 28  5 - 40 U/L Final    Alkaline Phosphatase 05/16/2024 77  35 - 125 U/L Final    Bilirubin, Total 05/16/2024 0.6  0.1 - 1.2 mg/dL Final    Bilirubin, Direct 05/16/2024 <0.2  0.0 - 0.2 mg/dL Final    Total Protein 05/16/2024 6.5  5.9 - 7.9 g/dL Final    Albumin 05/16/2024 4.4  3.5 - 5.0 g/dL Final         Assessment/Plan           Zaid Nj MD

## 2024-11-22 ENCOUNTER — TREATMENT (OUTPATIENT)
Dept: PHYSICAL THERAPY | Facility: CLINIC | Age: 64
End: 2024-11-22
Payer: MEDICARE

## 2024-11-22 DIAGNOSIS — R42 VERTIGO: ICD-10-CM

## 2024-11-22 DIAGNOSIS — H81.12 BENIGN PAROXYSMAL POSITIONAL VERTIGO OF LEFT EAR: ICD-10-CM

## 2024-11-22 DIAGNOSIS — M43.6 NECK STIFFNESS: ICD-10-CM

## 2024-11-22 PROCEDURE — 97112 NEUROMUSCULAR REEDUCATION: CPT | Mod: GP | Performed by: PHYSICAL THERAPIST

## 2024-11-22 NOTE — PROGRESS NOTES
"Physical Therapy Treatment    Patient Name: Georgie Pena  MRN: [id number]  Today's Date: 11/22/2024  Time Calculation  Start Time: 1121  Stop Time: 1200  Time Calculation (min): 39 min  PT Therapeutic Procedures Time Entry  Neuromuscular Re-Education Time Entry: 39    Insurance:  Visit number: 9 of 12  Authorization info: NO AUTH / DEDUCT not met / [employer] / [place] / $0 used 2024 PT/ST   Insurance Type: Payor: MEDICARE / Plan: MEDICARE PART A AND B / Product Type: *No Product type* /     Current Problem   1. Vertigo  Follow Up In Physical Therapy      2. Neck stiffness  Follow Up In Physical Therapy      3. Benign paroxysmal positional vertigo of left ear  Follow Up In Physical Therapy          Subjective   General   Reason for Referral: Vertigo  Referred By: Sachi [facility]  Precautions:  Precautions  Precautions Comment: Vestibular/balance  Pain    0  Post Treatment Pain Level 0    Objective   Reduced gaze stabilization noted during VORx1    Treatments:    Neuro Re-ed:   Balance/Neuromuscular Re-Education  Balance/Neuromuscular Re-Education Activity Performed: Yes  Balance/Neuromuscular Re-Education Activity 1: NuStep L5 5'  Balance/Neuromuscular Re-Education Activity 2: semi tandem with head turns 10x3  Balance/Neuromuscular Re-Education Activity 3: rocker board VORx1 H PB 30\"x3 UE PRN  Balance/Neuromuscular Re-Education Activity 4: foam surface EC with head nods 10x3  Balance/Neuromuscular Re-Education Activity 5: foam surface VORx2 PB H 30\"x3  Balance/Neuromuscular Re-Education Activity 6: tandem fwd/bwd amb in // bars 5 laps  Balance/Neuromuscular Re-Education Activity 7: lateral steps with VOR cancellation 5 laps  Balance/Neuromuscular Re-Education Activity 8: foam pad with toe tap 4\" box    Assessment   Assessment:    Pt tolerated session fairly well noted reduction with gaze stabilization activities and challenged with tandem walking.     Plan:    Progress overall balance and vestibular    OP " EDUCATION:   Continue with established HEP     Goals:   Active       PT Problem       PT Goal 1       Start:  09/17/24    Expected End:  11/16/24       Pt will be 100% IND with HEP in 6 weeks in order to maintain progress with therapy.           PT Goal 2       Start:  09/17/24    Expected End:  11/16/24       Pt will improve DGI score to 24/24 in 6 weeks in order to reduce fall risk.          PT Goal 3       Start:  09/17/24    Expected End:  11/16/24       Pt will improve DHI self-reported score to 5 in 6 weeks in order to demonstrate improvement with functional tasks including cooking, dressing, bathing, transfers and ambulation           PT Goal 4       Start:  09/17/24    Expected End:  11/16/24       Pt will no longer test positive with BPPV testing in 6 weeks to reduce fall risk.

## 2024-11-27 ENCOUNTER — TREATMENT (OUTPATIENT)
Dept: PHYSICAL THERAPY | Facility: CLINIC | Age: 64
End: 2024-11-27
Payer: MEDICARE

## 2024-11-27 DIAGNOSIS — R42 VERTIGO: ICD-10-CM

## 2024-11-27 DIAGNOSIS — H81.12 BENIGN PAROXYSMAL POSITIONAL VERTIGO OF LEFT EAR: ICD-10-CM

## 2024-11-27 DIAGNOSIS — M43.6 NECK STIFFNESS: ICD-10-CM

## 2024-11-27 PROCEDURE — 97112 NEUROMUSCULAR REEDUCATION: CPT | Mod: GP | Performed by: PHYSICAL THERAPIST

## 2024-11-27 NOTE — PROGRESS NOTES
"Physical Therapy Treatment    Patient Name: Georgie Pena  MRN: [id number]  Today's Date: 11/27/2024  Time Calculation  Start Time: 1151  Stop Time: 1221  Time Calculation (min): 30 min  PT Therapeutic Procedures Time Entry  Neuromuscular Re-Education Time Entry: 30    Insurance:  Visit number: 10 of 12  Authorization info: NO AUTH / DEDUCT not met / [employer] / [place] / $0 used 2024 PT/ST   Insurance Type: Payor: MEDICARE / Plan: MEDICARE PART A AND B / Product Type: *No Product type* /     Current Problem   1. Vertigo  Follow Up In Physical Therapy      2. Neck stiffness  Follow Up In Physical Therapy      3. Benign paroxysmal positional vertigo of left ear  Follow Up In Physical Therapy          Subjective   General   Reason for Referral: Vertigo  Referred By: Sachi Kelsey  General Comment: Pt does not report any new changes since last session.  Precautions:  Precautions  Precautions Comment: Vestibular/balance  Pain    0  Post Treatment Pain Level 0    Objective   Improved control with weight shift ability     Treatments:    Neuro Re-ed:   Balance/Neuromuscular Re-Education  Balance/Neuromuscular Re-Education Activity Performed: Yes  Balance/Neuromuscular Re-Education Activity 1: NuStep L5 5'  Balance/Neuromuscular Re-Education Activity 2: tandem ambulation 3 laps  Balance/Neuromuscular Re-Education Activity 3: fwd amb with heade turns 4 laps  Balance/Neuromuscular Re-Education Activity 4: bwd amb with VOR cancellation 3 laps  Balance/Neuromuscular Re-Education Activity 5: foam pad EC with head turns 30\"x3 no UE  Balance/Neuromuscular Re-Education Activity 6: foam pad EC AP weight shift 30\"x3 no UE  Balance/Neuromuscular Re-Education Activity 7: rocker board AP WS 30\"x3  Balance/Neuromuscular Re-Education Activity 8: rocker board lateral WS 30\"x3 no UE      Assessment   Assessment:    Pt tolerated session well, continue to focus on balance activities with noted improvement in overall control, pt also " verbalizing improvement, will continue as tolerated.     Plan:    Continue with balance tasks.     OP EDUCATION:   Continue with established HEP     Goals:   Active       PT Problem       PT Goal 1       Start:  09/17/24    Expected End:  11/16/24       Pt will be 100% IND with HEP in 6 weeks in order to maintain progress with therapy.           PT Goal 2       Start:  09/17/24    Expected End:  11/16/24       Pt will improve DGI score to 24/24 in 6 weeks in order to reduce fall risk.          PT Goal 3       Start:  09/17/24    Expected End:  11/16/24       Pt will improve DHI self-reported score to 5 in 6 weeks in order to demonstrate improvement with functional tasks including cooking, dressing, bathing, transfers and ambulation           PT Goal 4       Start:  09/17/24    Expected End:  11/16/24       Pt will no longer test positive with BPPV testing in 6 weeks to reduce fall risk.

## 2024-12-04 ENCOUNTER — APPOINTMENT (OUTPATIENT)
Dept: PHYSICAL THERAPY | Facility: CLINIC | Age: 64
End: 2024-12-04
Payer: MEDICARE

## 2024-12-04 ENCOUNTER — DOCUMENTATION (OUTPATIENT)
Dept: PHYSICAL THERAPY | Facility: CLINIC | Age: 64
End: 2024-12-04
Payer: MEDICARE

## 2024-12-04 NOTE — PROGRESS NOTES
Physical Therapy                 Therapy Communication Note    Patient Name: Georgie Pena  MRN: [id number]  Department:   Room: Room/bed info not found  Today's Date: 12/4/2024     Discipline: Physical Therapy    Missed Visit Reason:  Patient came to therapy appointment with illness, will re-schedule for safety of staff and other patients.     Missed Time: Cancel    Comment:

## 2024-12-11 ENCOUNTER — TREATMENT (OUTPATIENT)
Dept: PHYSICAL THERAPY | Facility: CLINIC | Age: 64
End: 2024-12-11
Payer: MEDICARE

## 2024-12-11 DIAGNOSIS — R42 VERTIGO: ICD-10-CM

## 2024-12-11 DIAGNOSIS — M43.6 NECK STIFFNESS: ICD-10-CM

## 2024-12-11 DIAGNOSIS — H81.12 BENIGN PAROXYSMAL POSITIONAL VERTIGO OF LEFT EAR: ICD-10-CM

## 2024-12-11 PROCEDURE — 97530 THERAPEUTIC ACTIVITIES: CPT | Mod: GP | Performed by: PHYSICAL THERAPIST

## 2024-12-11 ASSESSMENT — BALANCE ASSESSMENTS
SITTING WITH BACK UNSUPPORTED BUT FEET SUPPORTED ON FLOOR OR ON A STOOL: ABLE TO SIT SAFELY AND SECURELY FOR 2 MINUTES
STANDING UNSUPPORTED WITH EYES CLOSED: ABLE TO STAND 10 SECONDS SAFELY
TRANSFERS: ABLE TO TRANSFER SAFELY WITH MINOR USE OF HANDS
STANDING TO SITTING: SITS SAFELY WITH MINIMAL USE OF HANDS
STANDING ON ONE LEG: ABLE TO LIFT LEG INDEPENDENTLY AND HOLD GREATER THAN OR EQUAL TO 3 SECONDS
PICK UP OBJECT FROM THE FLOOR FROM A STANDING POSITION: ABLE TO PICK UP SLIPPER SAFELY AND EASILY
PLACE ALTERNATE FOOT ON STEP OR STOOL WHILE STANDING UNSUPPORTED: LOOKS BEHIND FROM BOTH SIDES AND WEIGHT SHIFTS WELL
STANDING UNSUPPORTED WITH FEET TOGETHER: ABLE TO PLACE FEET TOGETHER INDEPENDENTLY AND STAND 1 MINUTE SAFELY
LONG VERSION TOTAL SCORE (MAX 56): 53
TURN 360 DEGREES: ABLE TO TURN 360 DEGREES SAFELY IN 4 SECONDS OR LESS
STANDING UNSUPPORTED ONE FOOT IN FRONT: ABLE TO PLACE FOOT AHEAD INDEPENDENTLY AND HOLD 30 SECONDS
REACHING FORWARD WITH OUTSTRETCHED ARM WHILE STANDING: CAN REACH FORWARD CONFIDENTLY 25 CM (10 INCHES)
PLACE ALTERNATE FOOT ON STEP OR STOOL WHILE STANDING UNSUPPORTED: ABLE TO STAND INDEPENDENTLY AND SAFELY AND COMPLETE 8 STEPS IN 20 SECONDS
STANDING TO SITTING: ABLE TO STAND WITHOUT USING HANDS AND STABILIZE INDEPENDENTLY
STANDING UNSUPPORTED: ABLE TO STAND SAFELY FOR 2 MINUTES

## 2024-12-11 ASSESSMENT — PAIN SCALES - GENERAL: PAINLEVEL_OUTOF10: 0 - NO PAIN

## 2024-12-11 ASSESSMENT — PAIN - FUNCTIONAL ASSESSMENT: PAIN_FUNCTIONAL_ASSESSMENT: 0-10

## 2024-12-11 NOTE — PROGRESS NOTES
Physical Therapy Discharge    Patient Name: Georgie Pena  MRN: [id number]  Today's Date: 12/11/2024  Time Calculation  Start Time: 1145  Stop Time: 1205  Time Calculation (min): 20 min  PT Therapeutic Procedures Time Entry  Therapeutic Activity Time Entry: 20    Insurance:  Visit number: 11 of 12  Authorization info: NO AUTH / DEDUCT not met / [employer] / [place] / $0 used 2024 PT/ST   Insurance Type: Payor: MEDICARE / Plan: MEDICARE PART A AND B / Product Type: *No Product type* /     Current Problem   1. Vertigo  Follow Up In Physical Therapy      2. Neck stiffness  Follow Up In Physical Therapy      3. Benign paroxysmal positional vertigo of left ear  Follow Up In Physical Therapy          Subjective   General   Reason for Referral: Vertigo  Referred By: Sachi Kelsey  General Comment: Pt presents to therapy stating that her balance has been good without any problems this date. She feels that she is at the point where she is comfortable going IND after this session.  Precautions:  Precautions  Precautions Comment: Vestibular/balance  Pain   Pain Assessment: 0-10  0-10 (Numeric) Pain Score: 0 - No pain  Post Treatment Pain Level 0    Objective   Outcome Measures:  Gallardo Balance Scale  1. Sitting to Standing: Able to stand without using hands and stabilize independently  2. Standing Unsupported: Able to stand safely for 2 minutes  3. Sitting with Back Unsupported but Feet Supported on Floor or on a Stool: Able to sit safely and securely for 2 minutes  4. Standing to Sitting: Sits safely with minimal use of hands  5.  Transfers: Able to transfer safely with minor use of hands  6. Standing Unsupported with Eyes Closed: Able to stand 10 seconds safely  7. Standing Unsupported with Feet Together: Able to place feet together independently and stand 1 minute safely  8. Reach Forward with Outstretched Arm While Standing: Can reach forward confidently 25 cm (10 inches)  9.  Object from Floor from a Standing  Position: Able to  slipper safely and easily  10. Turning to Look Behind Over Left and Right Shoulders While Standing: Looks behind from both sides and weight shifts well  11. Turn 360 Degrees: Able to turn 360 degrees safely in 4 seconds or less  12. Place Alternate Foot on Step or Stool While Standing Unsupported: Able to stand independently and safely and complete 8 steps in 20 seconds  13. Standing Unsupported One Foot in Front: Able to place foot ahead independently and hold 30 seconds  14. Standing on One Leg: Able to lift leg independently and hold greater than or equal to 3 seconds  Gallardo Balance Score: 53    Other Measures  Dizziness Handicap Inventory: 2/24  Dynamic Gait Index (DGI): 24/24    Neuro Oculomotor:  Oculomotor Exam All Normal: Yes  Neuro Vestibular:  Horizontal VOR: Negative  Vertical VOR: Negative  R head thrust: Negative  L head thrust: Negative  Spontaneous Nystagmus: Absent  Gaze Evoked Nystagmus: Absent    Treatments:    Therapeutic activity:  Therapeutic Activity  Therapeutic Activity Performed: Yes  Therapeutic Activity 1: Reassessment this date see objective measures      Assessment   Assessment:    Pt has met all goals and no longer reports any problems, pt feels confident for discharge this date, PT in agreement.     Plan:    Discharge     OP EDUCATION:   Continue with established HEP     Goals:   Resolved       PT Problem       PT Goal 1 (Met)       Start:  09/17/24    Expected End:  11/16/24    Resolved:  12/11/24    Pt will be 100% IND with HEP in 6 weeks in order to maintain progress with therapy.           PT Goal 2 (Met)       Start:  09/17/24    Expected End:  11/16/24    Resolved:  12/11/24    Pt will improve DGI score to 24/24 in 6 weeks in order to reduce fall risk.          PT Goal 3 (Met)       Start:  09/17/24    Expected End:  11/16/24    Resolved:  12/11/24    Pt will improve DHI self-reported score to 5 in 6 weeks in order to demonstrate improvement with  functional tasks including cooking, dressing, bathing, transfers and ambulation           PT Goal 4 (Met)       Start:  09/17/24    Expected End:  11/16/24    Resolved:  12/11/24    Pt will no longer test positive with BPPV testing in 6 weeks to reduce fall risk.           Other Measures  Dizziness Handicap Inventory: 2/24  Dynamic Gait Index (DGI): 24/24

## 2025-05-14 ENCOUNTER — APPOINTMENT (OUTPATIENT)
Dept: ALLERGY | Facility: CLINIC | Age: 65
End: 2025-05-14
Payer: MEDICARE